# Patient Record
Sex: FEMALE | Race: WHITE
[De-identification: names, ages, dates, MRNs, and addresses within clinical notes are randomized per-mention and may not be internally consistent; named-entity substitution may affect disease eponyms.]

---

## 2018-07-19 ENCOUNTER — HOSPITAL ENCOUNTER (INPATIENT)
Dept: HOSPITAL 25 - ED | Age: 73
LOS: 6 days | Discharge: NURSING, REHAB OR LONG-TERM CARE | DRG: 470 | End: 2018-07-25
Attending: HOSPITALIST | Admitting: INTERNAL MEDICINE
Payer: MEDICARE

## 2018-07-19 DIAGNOSIS — Z79.1: ICD-10-CM

## 2018-07-19 DIAGNOSIS — M16.12: ICD-10-CM

## 2018-07-19 DIAGNOSIS — M85.822: ICD-10-CM

## 2018-07-19 DIAGNOSIS — S72.012A: Primary | ICD-10-CM

## 2018-07-19 DIAGNOSIS — M81.0: ICD-10-CM

## 2018-07-19 DIAGNOSIS — K21.9: ICD-10-CM

## 2018-07-19 DIAGNOSIS — Z79.899: ICD-10-CM

## 2018-07-19 DIAGNOSIS — Z82.49: ICD-10-CM

## 2018-07-19 DIAGNOSIS — Z83.3: ICD-10-CM
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
## 2018-07-19 DIAGNOSIS — I49.3: ICD-10-CM

## 2018-07-19 DIAGNOSIS — E78.5: ICD-10-CM

## 2018-07-19 DIAGNOSIS — J45.909: ICD-10-CM

## 2018-07-19 DIAGNOSIS — Z88.2: ICD-10-CM

## 2018-07-19 DIAGNOSIS — I48.0: ICD-10-CM

## 2018-07-19 DIAGNOSIS — Z88.0: ICD-10-CM
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
## 2018-07-19 DIAGNOSIS — S42.035A: ICD-10-CM

## 2018-07-19 DIAGNOSIS — M85.862: ICD-10-CM

## 2018-07-19 DIAGNOSIS — Z80.9: ICD-10-CM

## 2018-07-19 LAB
BASOPHILS # BLD AUTO: 0 10^3/UL (ref 0–0.2)
EOSINOPHIL # BLD AUTO: 0.1 10^3/UL (ref 0–0.6)
HCT VFR BLD AUTO: 42 % (ref 35–47)
HGB BLD-MCNC: 14.1 G/DL (ref 12–16)
INR PPP/BLD: 1.02 (ref 0.77–1.02)
LYMPHOCYTES # BLD AUTO: 0.9 10^3/UL (ref 1–4.8)
MCH RBC QN AUTO: 29 PG (ref 27–31)
MCHC RBC AUTO-ENTMCNC: 34 G/DL (ref 31–36)
MCV RBC AUTO: 85 FL (ref 80–97)
MONOCYTES # BLD AUTO: 0.9 10^3/UL (ref 0–0.8)
NEUTROPHILS # BLD AUTO: 4.5 10^3/UL (ref 1.5–7.7)
NRBC # BLD AUTO: 0 10^3/UL
NRBC BLD QL AUTO: 0.3
PLATELET # BLD AUTO: 193 10^3/UL (ref 150–450)
RBC # BLD AUTO: 4.95 10^6/UL (ref 4–5.4)
WBC # BLD AUTO: 6.5 10^3/UL (ref 3.5–10.8)

## 2018-07-19 PROCEDURE — 70450 CT HEAD/BRAIN W/O DYE: CPT

## 2018-07-19 PROCEDURE — 84484 ASSAY OF TROPONIN QUANT: CPT

## 2018-07-19 PROCEDURE — 88311 DECALCIFY TISSUE: CPT

## 2018-07-19 PROCEDURE — 82565 ASSAY OF CREATININE: CPT

## 2018-07-19 PROCEDURE — 72125 CT NECK SPINE W/O DYE: CPT

## 2018-07-19 PROCEDURE — 88305 TISSUE EXAM BY PATHOLOGIST: CPT

## 2018-07-19 PROCEDURE — C1776 JOINT DEVICE (IMPLANTABLE): HCPCS

## 2018-07-19 PROCEDURE — 71045 X-RAY EXAM CHEST 1 VIEW: CPT

## 2018-07-19 PROCEDURE — 85014 HEMATOCRIT: CPT

## 2018-07-19 PROCEDURE — 99284 EMERGENCY DEPT VISIT MOD MDM: CPT

## 2018-07-19 PROCEDURE — 84520 ASSAY OF UREA NITROGEN: CPT

## 2018-07-19 PROCEDURE — 36415 COLL VENOUS BLD VENIPUNCTURE: CPT

## 2018-07-19 PROCEDURE — 72192 CT PELVIS W/O DYE: CPT

## 2018-07-19 PROCEDURE — 93306 TTE W/DOPPLER COMPLETE: CPT

## 2018-07-19 PROCEDURE — 93005 ELECTROCARDIOGRAM TRACING: CPT

## 2018-07-19 PROCEDURE — 82607 VITAMIN B-12: CPT

## 2018-07-19 PROCEDURE — 84443 ASSAY THYROID STIM HORMONE: CPT

## 2018-07-19 PROCEDURE — 80053 COMPREHEN METABOLIC PANEL: CPT

## 2018-07-19 PROCEDURE — 87086 URINE CULTURE/COLONY COUNT: CPT

## 2018-07-19 PROCEDURE — 85048 AUTOMATED LEUKOCYTE COUNT: CPT

## 2018-07-19 PROCEDURE — 83605 ASSAY OF LACTIC ACID: CPT

## 2018-07-19 PROCEDURE — 85025 COMPLETE CBC W/AUTO DIFF WBC: CPT

## 2018-07-19 PROCEDURE — 83735 ASSAY OF MAGNESIUM: CPT

## 2018-07-19 PROCEDURE — 85730 THROMBOPLASTIN TIME PARTIAL: CPT

## 2018-07-19 PROCEDURE — 87641 MR-STAPH DNA AMP PROBE: CPT

## 2018-07-19 PROCEDURE — 85018 HEMOGLOBIN: CPT

## 2018-07-19 PROCEDURE — 81003 URINALYSIS AUTO W/O SCOPE: CPT

## 2018-07-19 PROCEDURE — 85610 PROTHROMBIN TIME: CPT

## 2018-07-19 PROCEDURE — 80048 BASIC METABOLIC PNL TOTAL CA: CPT

## 2018-07-19 RX ADMIN — OXYCODONE HYDROCHLORIDE AND ACETAMINOPHEN PRN TAB: 5; 325 TABLET ORAL at 20:27

## 2018-07-19 RX ADMIN — SODIUM CHLORIDE SCH MLS/HR: 900 IRRIGANT IRRIGATION at 23:44

## 2018-07-19 RX ADMIN — TRAMADOL HYDROCHLORIDE PRN MG: 50 TABLET, FILM COATED ORAL at 15:28

## 2018-07-19 RX ADMIN — MORPHINE SULFATE PRN MG: 4 INJECTION INTRAVENOUS at 22:12

## 2018-07-19 RX ADMIN — MAGNESIUM HYDROXIDE SCH: 400 SUSPENSION ORAL at 20:28

## 2018-07-19 RX ADMIN — HEPARIN SODIUM SCH UNITS: 5000 INJECTION INTRAVENOUS; SUBCUTANEOUS at 14:13

## 2018-07-19 RX ADMIN — HEPARIN SODIUM SCH UNITS: 5000 INJECTION INTRAVENOUS; SUBCUTANEOUS at 22:12

## 2018-07-19 RX ADMIN — DOCUSATE SODIUM SCH: 100 CAPSULE, LIQUID FILLED ORAL at 20:28

## 2018-07-19 RX ADMIN — ATORVASTATIN CALCIUM SCH: 20 TABLET, FILM COATED ORAL at 20:28

## 2018-07-19 NOTE — RAD
INDICATION: Fall. Intracranial injury.



COMPARISON: None



TECHNIQUE: Noncontrast axial source images were acquired from the skull base to the

vertex.



FINDINGS:



Ventricles/sulci: The ventricles and cisterns are normal in size and configuration for

age.



Brain parenchyma: There is no acute appearing focal parenchymal finding, evidence of

intracranial mass,  or intracranial mass effect. There are patchy areas of decreased

attenuation in the periventricular and subcortical white matter consistent with chronic

microvascular ischemia



Intracranial hemorrhage:None.



Extra-axial spaces: There are no abnormal extra axial fluid collections or evidence of

extra-axial mass.



Calvarium: There is no calvarial fracture or other calvarial abnormality.



Scalp: There is no evidence of scalp or extracalvarial soft tissue abnormality.



Paranasal sinuses/mastoid: The paranasal sinuses and mastoid air cells are clear.



Other: None.



IMPRESSION: Mild chronic microvascular ischemic change. No acute findings.

## 2018-07-19 NOTE — RAD
HISTORY: fall, left-sided pain



COMPARISONS: None



VIEWS: 4, Frontal, lateral, axial, and oblique views of the left knee



FINDINGS:



BONE DENSITY: There is diffuse osteopenia.

BONES: There is no displaced fracture.

JOINTS: There is mild tricompartmental osteoarthritis. There is no suprapatellar joint

effusion or lipohemarthrosis.

ALIGNMENT: There is no dislocation. 

SOFT TISSUES: Unremarkable.



OTHER FINDINGS: None.



IMPRESSION: 

OSTEOPENIA. NO ACUTE OSSEOUS INJURY. IF SYMPTOMS PERSIST, RECOMMEND REPEAT IMAGING.

## 2018-07-19 NOTE — RAD
INDICATION: Fall. Tachycardia.



COMPARISON: February 22, 2010

 

TECHNIQUE: An AP portable view obtained at 1345 hours is submitted.



FINDINGS: 



Bones/Soft Tissues: There are no acute bony findings.



Cardiomediastinal: The cardiomediastinal silhouette is normal. 



Lungs: There are no infiltrates.



Pleura: There are no pleural effusions. 



Other: None



IMPRESSION:  NO ACTIVE DISEASE.

## 2018-07-19 NOTE — RAD
INDICATION: Fall last Saturday. LEFT hip fracture. LEFT shoulder pain. Denies hitting head



COMPARISON: No relevant prior exams available [place] [facility] [place].



TECHNIQUE: Multidetector CT images foramen magnum to lung apices without contrast.

Multiplanar reformation.



REPORT:  Normal vertebral alignment accounting for exam positioning without

spondylolisthesis or subluxation at any level. Negative for cervical vertebral body or

posterior element fracture. Negative for paravertebral hematoma.



Multilevel degenerative spondylosis and facet joint osteoarthritis. More significant facet

joint osteoarthritis is noted at C2-C3 on the LEFT with complete joint space narrowing and

associated subchondral sclerosis and cystic change. At C4-C5 there is mild to moderate

disc space narrowing and at C5-C6 there is moderate disc space narrowing. Disc osteophyte

complex at C5-C6 results in mild acquired central canal stenosis. 



IMPRESSION: 

#. No CT evidence for traumatic cervical spine injury.

## 2018-07-19 NOTE — ED
Upper Extremity Pain





- HPI Summary


HPI Summary: 





This is liliam Israel documenting for attending Dr. Phillip M.D.


Pt is a 72 y/o F w/ c/o left shoulder pain onsetting five days ago. Pt states 

five days ago she was doing yardwork. While squatting, she reports getting up 

too quickly, states she felt light-headed, and lost her balance which resulted 

in the shoulder injury. She believes she may have hit the leftside of her jaw 

during the fall as well but did not experience LOC. Pt notes she had a similar 

fall a few months ago as well. She also reports a wound present on her left leg 

which required stitches and lower leg pain which prevents her from getting up 

and walking on her own. Hx of osteoporosis, HLD, and GERD is reported. Allergy 

to sulfur medications, PCTV, tree fruits and nuts is noted. Pt is on 

omerprazole caps, 20 mg, rosevastatin tabs, 10 mg, and vitamin tablets. She 

denies taking blood thinners. 








- History of Current Complaint


Chief Complaint: EDGeneral


Stated Complaint: PAIN


Time Seen by Provider: 07/19/18 09:04





- Allergies/Home Medications


Allergies/Adverse Reactions: 


 Allergies











Allergy/AdvReac Type Severity Reaction Status Date / Time


 


Sulfa (Sulfonamide Allergy Intermediate Rash Verified 07/19/18 09:14





Antibiotics)     


 


Penicillins Allergy  Unknown Verified 07/19/18 09:14





   Reaction  





   Details  











Home Medications: 


 Home Medications





Acetaminophen [Tylenol Extra Strength] 500 mg PO Q8H PRN 07/19/18 [History 

Confirmed 07/19/18]











PMH/Surg Hx/FS Hx/Imm Hx


Endocrine/Hematology History: 


   Denies: Hx Sickle Cell Disease


Cardiovascular History: 


   Denies: Other Cardiovascular Problems/Disorders


Respiratory History: 


   Denies: Other Respiratory Problems/Disorders


GI History: Reports: Hx Gastroesophageal Reflux Disease - WELL CONTROLLED WITH 

MEDICATION


   Denies: Other GI Disorders


 History: 


   Denies: Other  Problems/Disorders


Musculoskeletal History: Reports: Hx Arthritis - THUMBS


   Denies: Other Musculoskeletal History


Sensory History: Reports: Hx Contacts or Glasses - GLASSES


   Denies: Hx Hearing Aid


Opthamlomology History: Reports: Hx Contacts or Glasses - GLASSES


Neurological History: 


   Denies: Other Neuro Impairments/Disorders





- Cancer History


Hx Chemotherapy: No


Hx Radiation Therapy: No





- Surgical History


Surgery Procedure, Year, and Place: HYSTERECTOMY - 1990 [facility].  GALLBLADDER - 

1995 [facility]


Hx Anesthesia Reactions: No


Infectious Disease History: No


Infectious Disease History: 


   Denies: Traveled Outside the [place] in Last 30 Days





- Social History


Alcohol Use: None


Substance Use Type: Reports: None


Smoking Status (MU): Never Smoked Tobacco





Physical Exam





- Summary


Physical Exam Summary: 





GENERAL: Patient is a well developed and nourished __(M/F)__ who is lying 

comfortable in the stretcher. Patient is not in any acute respiratory distress.


HEAD AND FACE: Normocephalic


EYES: PERRLA, EOMI x 2.


EARS: Hearing grossly intact.


MOUTH: Oropharynx within normal limits.


NECK: Supple, trachea is midline, no adenopathy, no JVD, no carotid bruit.


CHEST: Symmetric, no tenderness at palpation


LUNGS: Clear to auscultation bilaterally. No wheezing or crackles.


CVS: Regular rate and rhythm, S1 and S2 present, no murmurs or gallops 

appreciated.


ABDOMEN: Soft, non-tender. Bowel sounds are normal. No abdominal abnormal 

pulsations.


EXTREMITIES: No cyanosis or clubbing. Ecchymosis on left shoulder w/ tenderness 

to palpation, FROM; nothing in the left humerus. Left leg wound is noted. Pain 

from flexion in left knee and limited ROM secondary to pain. 


NEURO: Alert and oriented x 3. No acute neurological deficits. Speech is normal 

and follows commands.


SKIN: Dry and warm





Triage Information Reviewed: Yes


Vital Signs On Initial Exam: 


 Initial Vitals











Temp Pulse Resp BP Pulse Ox


 


 97.9 F   110   18   146/95   99 


 


 07/19/18 09:10  07/19/18 09:10  07/19/18 09:10  07/19/18 09:10  07/19/18 09:10











Vital Signs Reviewed: Yes





Diagnostics





- Vital Signs


 Vital Signs











  Temp Pulse Resp BP Pulse Ox


 


 07/19/18 09:11   110    98


 


 07/19/18 09:10  97.9 F  110  18  146/95  99














- Laboratory


Result Diagrams: 


 07/19/18 09:54





 07/19/18 09:54


Lab Statement: Any lab studies that have been ordered have been reviewed, and 

results considered in the medical decision making process.





- Radiology


  ** Left hip X-ray


Xray Interpretation: Positive (See Comments)


Radiology Interpretation Completed By: Radiologist - Nondisplaced fracture of 

the subcapital femoral neck on the left; osteopenia; osteoarthritis. This 

report was reviewed by ED physician.





  ** Left knee X-ray


Xray Interpretation: No Acute Changes


Radiology Interpretation Completed By: Radiologist - Osteopenia. No acture 

osseous injury. If symptoms persist, recommend repeat imaging. This report was 

reviewed by ED physician.





  ** Left shoulder X-ray


Xray Interpretation: Positive (See Comments)


Radiology Interpretation Completed By: Radiologist - Comminuted nondisplaced 

fracture of the distal left clavicle. Osteopenia. This report was reviewed by 

ED physician.





- EKG


  ** 0948


Cardiac Rate: Tachycardia - Rate of 104 BPM


EKG Rhythm: Sinus Tachycardia


EKG Interpretation: PACs are noted as present 





Re-Evaluation





- Re-Evaluation


  ** First Eval


Re-Evaluation Time: 11:20


Change: Unchanged


Comment: Discussed X-ray results with patient.





Discharge





- Discharge Plan


Referrals: 


Nick Charlton MD [Primary Care Provider] -

## 2018-07-19 NOTE — RAD
HISTORY: fall, left-sided pain



COMPARISONS: None



VIEWS: 6, Frontal internal rotation, external rotation, outlet, and axillary views of the

left shoulder



FINDINGS:



BONE DENSITY: There is diffuse osteopenia.

BONES: There is a comminuted nondisplaced fracture of the distal (lateral) left clavicle.

JOINTS: There is mild osteoarthritis of the a.c. and glenohumeral joints.

ALIGNMENT: There is no dislocation. 

SOFT TISSUES: Unremarkable.



OTHER FINDINGS: None.



IMPRESSION: 

1.  COMMINUTED NONDISPLACED FRACTURE OF THE DISTAL LEFT CLAVICLE.

2.  OSTEOPENIA.

## 2018-07-19 NOTE — ED
Complex/Multi-Sys Presentation





- HPI Summary


HPI Summary: 





This is liliam Israel documenting for attending Dr. Phillip M.D.


Pt is a 72 y/o F w/ c/o left shoulder pain onsetting five days ago. Pt states 

five days ago she was doing yardwork. While squatting, she reports getting up 

too quickly, states she felt light-headed, and lost her balance which resulted 

in the shoulder injury. She believes she may have hit the leftside of her jaw 

during the fall as well but did not experience LOC. Pt notes she had a similar 

fall a few months ago as well. She also reports left sided hip pain, a wound 

from a separate incident present on her left leg which required stitches, and 

lower leg pain which prevents her from getting up and walking on her own. Pain 

is rated 8/10 and nothing is noted to aggravate/alleviate pain. Hx of 

osteoporosis, HLD, and GERD is reported. Allergy to sulfur medications, PCTV, 

tree fruits and nuts is noted. Pt is on omerprazole caps, 20 mg, rosevastatin 

tabs, 10 mg, and vitamin tablets. She denies taking blood thinners.








- History Of Current Complaint


Chief Complaint: EDGeneral


Time Seen by Provider: 07/19/18 09:04


Hx Obtained From: Patient


Onset/Duration: Lasting Days - 5 days ago


Timing: Constant, Days - 5 days


Severity Initially: Severe - 8/10


Location: Pain At: - left shoulder, left hip, lower left leg pain


Aggravating Factor(s): nothing


Alleviating Factor(s): nothing


Associated Signs And Symptoms: Positive: Dizziness - light-headedness at time 

of fall, Recent Trauma - fall from a standing height, Other - NEGATIVE: LOC 

from falling injury





- Allergies/Home Medications


Allergies/Adverse Reactions: 


 Allergies











Allergy/AdvReac Type Severity Reaction Status Date / Time


 


Sulfa (Sulfonamide Allergy Intermediate Rash Verified 07/19/18 09:14





Antibiotics)     


 


Penicillins Allergy  Unknown Verified 07/19/18 09:14





   Reaction  





   Details  











Home Medications: 


 Home Medications





Acetaminophen [Tylenol Extra Strength] 500 mg PO Q8H PRN 07/19/18 [History 

Confirmed 07/19/18]











PMH/Surg Hx/FS Hx/Imm Hx


Endocrine/Hematology History: 


   Denies: Hx Sickle Cell Disease


Cardiovascular History: 


   Denies: Other Cardiovascular Problems/Disorders


Respiratory History: 


   Denies: Other Respiratory Problems/Disorders


GI History: Reports: Hx Gastroesophageal Reflux Disease - WELL CONTROLLED WITH 

MEDICATION


   Denies: Other GI Disorders


 History: 


   Denies: Other  Problems/Disorders


Musculoskeletal History: Reports: Hx Arthritis - THUMBS


   Denies: Other Musculoskeletal History


Sensory History: Reports: Hx Contacts or Glasses - GLASSES


   Denies: Hx Hearing Aid


Opthamlomology History: Reports: Hx Contacts or Glasses - GLASSES


Neurological History: 


   Denies: Other Neuro Impairments/Disorders





- Cancer History


Hx Chemotherapy: No


Hx Radiation Therapy: No





- Surgical History


Surgery Procedure, Year, and Place: HYSTERECTOMY - 1990 [facility].  GALLBLADDER - 

1995 [facility]


Hx Anesthesia Reactions: No


Infectious Disease History: No


Infectious Disease History: 


   Denies: Traveled Outside the [place] in Last 30 Days





- Social History


Alcohol Use: None


Substance Use Type: Reports: None


Smoking Status (MU): Never Smoked Tobacco





Review of Systems


Positive: Other - POSITIVE: left hip pain, left shoulder pain, left lower knee 

pain, inability to walk


Neurological: Other - POSITIVE: Light-headedness during time of injury 


Negative: Syncope - no LOC during time of injury


All Other Systems Reviewed And Are Negative: Yes





Physical Exam





- Summary


Physical Exam Summary: 





GENERAL: Patient is a well developed and nourished female who is lying 

comfortable in the stretcher. Patient is not in any acute respiratory distress.


HEAD AND FACE: Normocephalic


EYES: PERRLA, EOMI x 2.


EARS: Hearing grossly intact.


MOUTH: Oropharynx within normal limits.


NECK: Supple, trachea is midline, no adenopathy, no JVD, no carotid bruit.


CHEST: Symmetric, no tenderness at palpation


LUNGS: Clear to auscultation bilaterally. No wheezing or crackles.


CVS: Regular rate and rhythm, S1 and S2 present, no murmurs or gallops 

appreciated.


ABDOMEN: Soft, non-tender. Bowel sounds are normal. No abdominal abnormal 

pulsations.


EXTREMITIES: No edema, no cyanosis or clubbing. Ecchymosis on left shoulder, 

tenderness to palpation, nothing in the humerus. Left leg wound is noted. Pain 

from flexion in left knee, limited ROM secondary to pain. 


NEURO: Alert and oriented x 3. No acute neurological deficits. Speech is normal 

and follows commands.


SKIN: Dry and warm





Triage Information Reviewed: Yes


Vital Signs On Initial Exam: 


 Initial Vitals











Temp Pulse Resp BP Pulse Ox


 


 97.9 F   110   18   146/95   99 


 


 07/19/18 09:10  07/19/18 09:10  07/19/18 09:10  07/19/18 09:10  07/19/18 09:10











Vital Signs Reviewed: Yes





Diagnostics





- Vital Signs


 Vital Signs











  Temp Pulse Resp BP Pulse Ox


 


 07/19/18 11:10   102   129/88  96


 


 07/19/18 11:00   102    98


 


 07/19/18 10:58   99   141/92  99


 


 07/19/18 10:57      100


 


 07/19/18 10:47   101    98


 


 07/19/18 09:39     133/86 


 


 07/19/18 09:11   110    98


 


 07/19/18 09:10  97.9 F  110  18  146/95  99














- Laboratory


Lab Results: 


 Lab Results











  07/19/18 07/19/18 07/19/18 Range/Units





  09:48 09:54 09:54 


 


WBC   6.5   (3.5-10.8)  10^3/ul


 


RBC   4.95   (4.00-5.40)  10^6/ul


 


Hgb   14.1   (12.0-16.0)  g/dl


 


Hct   42   (35-47)  %


 


MCV   85   (80-97)  fL


 


MCH   29   (27-31)  pg


 


MCHC   34   (31-36)  g/dl


 


RDW   14   (10.5-15)  %


 


Plt Count   193   (150-450)  10^3/ul


 


MPV   7.8   (7.4-10.4)  um3


 


Neut % (Auto)   69.9   (38-83)  %


 


Lymph % (Auto)   13.8 L   (25-47)  %


 


Mono % (Auto)   14.2 H   (0-7)  %


 


Eos % (Auto)   1.4   (0-6)  %


 


Baso % (Auto)   0.7   (0-2)  %


 


Absolute Neuts (auto)   4.5   (1.5-7.7)  10^3/ul


 


Absolute Lymphs (auto)   0.9 L   (1.0-4.8)  10^3/ul


 


Absolute Monos (auto)   0.9 H   (0-0.8)  10^3/ul


 


Absolute Eos (auto)   0.1   (0-0.6)  10^3/ul


 


Absolute Basos (auto)   0   (0-0.2)  10^3/ul


 


Absolute Nucleated RBC   0   10^3/ul


 


Nucleated RBC %   0.3   


 


INR (Anticoag Therapy)    1.02  (0.77-1.02)  


 


APTT    45.1 H  (26.0-36.3)  seconds


 


Sodium     (135-145)  mmol/L


 


Potassium     (3.5-5.0)  mmol/L


 


Chloride     (101-111)  mmol/L


 


Carbon Dioxide     (22-32)  mmol/L


 


Anion Gap     (2-11)  mmol/L


 


BUN     (6-24)  mg/dL


 


Creatinine     (0.51-0.95)  mg/dL


 


Est GFR ( Amer)     (>60)  


 


Est GFR (Non-Af Amer)     (>60)  


 


BUN/Creatinine Ratio     (8-20)  


 


Glucose     ()  mg/dL


 


Lactic Acid     (0.5-2.0)  mmol/L


 


Calcium     (8.6-10.3)  mg/dL


 


Total Bilirubin     (0.2-1.0)  mg/dL


 


AST     (13-39)  U/L


 


ALT     (7-52)  U/L


 


Alkaline Phosphatase     ()  U/L


 


Troponin I     (<0.04)  ng/mL


 


Total Protein     (6.4-8.9)  g/dL


 


Albumin     (3.2-5.2)  g/dL


 


Globulin     (2-4)  g/dL


 


Albumin/Globulin Ratio     (1-3)  


 


Urine Color  Straw    


 


Urine Appearance  Clear    


 


Urine pH  7.0    (5-9)  


 


Ur Specific Gravity  1.003 L    (1.010-1.030)  


 


Urine Protein  Negative    (Negative)  


 


Urine Ketones  1+ A    (Negative)  


 


Urine Blood  Negative    (Negative)  


 


Urine Nitrate  Negative    (Negative)  


 


Urine Bilirubin  Negative    (Negative)  


 


Urine Urobilinogen  Negative    (Negative)  


 


Ur Leukocyte Esterase  Negative    (Negative)  


 


Urine Glucose  Negative    (Negative)  














  07/19/18 07/19/18 Range/Units





  09:54 09:54 


 


WBC    (3.5-10.8)  10^3/ul


 


RBC    (4.00-5.40)  10^6/ul


 


Hgb    (12.0-16.0)  g/dl


 


Hct    (35-47)  %


 


MCV    (80-97)  fL


 


MCH    (27-31)  pg


 


MCHC    (31-36)  g/dl


 


RDW    (10.5-15)  %


 


Plt Count    (150-450)  10^3/ul


 


MPV    (7.4-10.4)  um3


 


Neut % (Auto)    (38-83)  %


 


Lymph % (Auto)    (25-47)  %


 


Mono % (Auto)    (0-7)  %


 


Eos % (Auto)    (0-6)  %


 


Baso % (Auto)    (0-2)  %


 


Absolute Neuts (auto)    (1.5-7.7)  10^3/ul


 


Absolute Lymphs (auto)    (1.0-4.8)  10^3/ul


 


Absolute Monos (auto)    (0-0.8)  10^3/ul


 


Absolute Eos (auto)    (0-0.6)  10^3/ul


 


Absolute Basos (auto)    (0-0.2)  10^3/ul


 


Absolute Nucleated RBC    10^3/ul


 


Nucleated RBC %    


 


INR (Anticoag Therapy)    (0.77-1.02)  


 


APTT    (26.0-36.3)  seconds


 


Sodium  141   (135-145)  mmol/L


 


Potassium  3.6   (3.5-5.0)  mmol/L


 


Chloride  102   (101-111)  mmol/L


 


Carbon Dioxide  29   (22-32)  mmol/L


 


Anion Gap  10   (2-11)  mmol/L


 


BUN  6   (6-24)  mg/dL


 


Creatinine  0.63   (0.51-0.95)  mg/dL


 


Est GFR ( Amer)  111.8   (>60)  


 


Est GFR (Non-Af Amer)  92.4   (>60)  


 


BUN/Creatinine Ratio  9.5   (8-20)  


 


Glucose  107 H   ()  mg/dL


 


Lactic Acid   1.0  (0.5-2.0)  mmol/L


 


Calcium  9.2   (8.6-10.3)  mg/dL


 


Total Bilirubin  1.10 H   (0.2-1.0)  mg/dL


 


AST  15   (13-39)  U/L


 


ALT  11   (7-52)  U/L


 


Alkaline Phosphatase  49   ()  U/L


 


Troponin I  0.00   (<0.04)  ng/mL


 


Total Protein  6.5   (6.4-8.9)  g/dL


 


Albumin  3.9   (3.2-5.2)  g/dL


 


Globulin  2.6   (2-4)  g/dL


 


Albumin/Globulin Ratio  1.5   (1-3)  


 


Urine Color    


 


Urine Appearance    


 


Urine pH    (5-9)  


 


Ur Specific Gravity    (1.010-1.030)  


 


Urine Protein    (Negative)  


 


Urine Ketones    (Negative)  


 


Urine Blood    (Negative)  


 


Urine Nitrate    (Negative)  


 


Urine Bilirubin    (Negative)  


 


Urine Urobilinogen    (Negative)  


 


Ur Leukocyte Esterase    (Negative)  


 


Urine Glucose    (Negative)  











Result Diagrams: 


 07/20/18 05:11





 07/20/18 05:11


Lab Statement: Any lab studies that have been ordered have been reviewed, and 

results considered in the medical decision making process.





- Radiology


  ** Left hip X-ray


Xray Interpretation: Positive (See Comments)


Radiology Interpretation Completed By: Radiologist - Nondisplaced fracture of 

the subcapital femoral neck on the left. Osteopenia. Osteoarthiritis. This 

report was reviewed by ED physician.





  ** Left knee X-ray


Xray Interpretation: No Acute Changes


Radiology Interpretation Completed By: Radiologist - Osteopenia, no acute 

osseous injury. This report was reviewed by ED physician.





  ** Left shoulder X-ray


Xray Interpretation: Positive (See Comments)


Radiology Interpretation Completed By: Radiologist - Comminuted nondisplaced 

fracture of the distal left clavicle, osteopenia. This report was reviewed by 

ED physician.





- EKG


  ** 0948


Cardiac Rate: Tachycardia - Rate of 104 BPM


EKG Rhythm: Sinus Tachycardia


EKG Interpretation: PACs are present. 





Complex Multi-Symp Course/Dx


Assessment/Plan: Pt is a 72 y/o F w/ c/o left shoulder pain onsetting five days 

ago. While doing yardwork and squatting, she reports getting up too quickly, 

states she felt light-headed, and lost her balance which resulted in the 

shoulder injury. She believes she may have hit the leftside of her jaw during 

the fall as well but did not experience LOC. Pt notes she had a similar fall a 

few months ago as well. She also reports left sided hip pain, a wound from a 

separate incident present on her left leg which required stitches, and lower 

leg pain which prevents her from getting up and walking on her own. Pain is 

rated 8/10 and nothing is noted to aggravate/alleviate pain. Hx of osteoporosis

, HLD, and GERD is reported. Allergy to sulfur medications, PCTV, tree fruits 

and nuts is noted. Pt is on omerprazole caps, 20 mg, rosevastatin tabs, 10 mg, 

and vitamin tablets. She denies taking blood thinners. Physical exam showed 

ecchymosis on left shoulder, tenderness to palpation, nothing in the humerus. 

Left leg wound is noted. Pain from flexion in left knee, limited ROM secondary 

to pain. X-rays were taken of left hip/pelvis, left knee, and left shoulder. 

Comminuted nondisplaced fracture of the distal left clavicle and osteopenia was 

noted in left shoulder. Nondisplaced fracture of the subcapital femoral neck on 

the left, osteopenia and osteoarthiritis were noted from left hip/pelvis X-ray. 

Dr. Thapa was consulted who recommended CAT of pelvis and admission to 

hospital Pt was later discussed with Dr. Cowart who accepts Pt for admission to 

[facility] with a diagnosis of left hip fracture.





- Diagnoses


Provider Diagnoses: 


 Hip fracture, left








- Physician Notifications


Discussed Care Of Patient With: Fahad Thapa


Time Discussed With Above Provider: 11:42


Instructed by Provider To: Other - Dr. Thapa was consulted about Pt's 

fractures. Dr. Thapa recommends CAT scan of pelvis as well as admission to 

hospital. 1156: Consult with hospitalist Dr. Cowart. Discussed Pt's Sx, test 

results, and Dr. Thapa's recommendation. Dr. Cowart agrees to accept Pt to [facility].





Discharge





- Sign-Out/Discharge


Documenting (check all that apply): Patient Departure - admit 





- Discharge Plan


Condition: Fair


Disposition: ADMITTED TO [facility]





- Billing Disposition and Condition


Condition: FAIR


Disposition: Admitted to [facility]

## 2018-07-19 NOTE — RAD
Indication: LEFT hip pain since fall last Saturday. Osteoporosis based on May 12, 2011

DEXA scan.



Comparison: No relevant prior exams available on the [facility] PACS for comparison.



Technique: Multidetector CT pelvis without contrast. Multiplanar reformation with bone

algorithm.



Report: Mildly impacted subcapital fracture of the LEFT femoral neck with mild apex

anterior angulation and small hip joint effusion and surrounding soft tissue edema. The

LEFT femoral head remains normally located at the acetabulum.



Negative for pelvic fracture or joint diastases.



Images through the visceral pelvis are remarkable for prior appendectomy and severe

diverticulosis of the visualized colon without findings of acute diverticulitis. Negative

for ascites. Post hysterectomy. Unremarkable adnexal regions.



Negative for superficial soft tissue plane hematoma.



IMPRESSION: 

#. Acute/subacute mildly impacted subcapital fracture of the LEFT femoral neck with mild

apex anterior angulation and small associated joint effusion and surrounding soft tissue

edema. 

#. Results discussed with nurse Woodson at 7/19/2018 1:43 PM EDT

## 2018-07-19 NOTE — CONS
CONSULTATION REPORT:

 

DATE OF CONSULT:  07/19/18

 

ATTENDING PROVIDER:  Dr. Fahad Thapa.

 

CHIEF COMPLAINT:  Left hip fracture.

 

HISTORY OF PRESENT ILLNESS:  Ms. Luis is a 73-year-old female who presented to 
[facility] [place] [facility] on 07/19/18 with left hip and shoulder pain, onset 5 days 
ago.  She states that 5 days ago, she was doing yard work and was squatting 
doing yard work. She was squatting and when she went to stand up, she became 
lightheaded and fell down on to her left side.  She did have immediate pain of 
her left shoulder and her left hip, but as she has fallen before without 
significant injury she did not feel that she needed to come right to the 
emergency room.  She states that she did hit her jaw on the left side, but had 
no loss of consciousness.  Since then, she has continued to get around on her 
own, though she has been rolling around her home in an office chair.  She is 
unable to walk on the left lower extremity.  She has been taking Tylenol only 
for pain, which has offered adequate pain reduction.  Her past medical history 
includes osteoporosis, hyperlipidemia, GERD.

 

Roughly 3 months ago, she had a similar fall at home in which she hit her left 
lower leg on a step and had a large wound that has been treated first by Dr. Rae and then [facility].  This area has almost 
healed, though she is applying new bandage to it still once per week.  She has 
not had infection of this area in several months.  The patient has had surgery 
in the past without any negative side effects from anesthesia.  She has no 
history of heart attacks, stroke, or blood clot.

 

PAST MEDICAL HISTORY:  Includes wound to the left lower extremity, traumatic in 
nature, well-healing; hyperlipidemia; GERD.

 

PAST SURGICAL HISTORY:  Includes hysterectomy, cholecystectomy, possibly a 
partial colectomy, the patient was unsure.

 

MEDICATIONS:  Home medications:

1.  Rosuvastatin 10 mg daily.

2.  Omeprazole 20 mg daily.

3.  Acetaminophen 500 mg p.o. q.8 hours p.r.n. pain.

 

ALLERGIES:  SULFA drugs causing a rash, PENICILLINS with unknown reaction.

 

SOCIAL HISTORY:  The patient lives at home alone.  She takes care of herself.  
She continues to participate in yard work and activities of daily living 
without any assistive devices to walk.  Her daughter does live nearby.  She is 
a nonsmoker. She does not drink alcohol or use drugs.

 

REVIEW OF SYSTEMS:  General:  Denies fever or chills.  Head:  No headache.  No 
change in vision.  Heart:  No irregular heart beat.  No chest pain.  Respiratory
: No difficulty breathing.  GI:  No nausea, vomiting, diarrhea.  :  No 
burning with urination.  Musculoskeletal:  Left upper extremity and left lower 
extremity pain. Neuro:  No decreased sensation of extremities.  Hematology:  No 
history of blood clot.

 

PHYSICAL EXAM:  Vital Signs:  Temperature 97.9, pulse rate currently 87, but 
she has had some tachycardia up to 110, respiratory rate 17, oxygen saturation 
98, blood pressure 125/89.  General:  The patient is well appearing, in no 
acute distress.  She is lying comfortably in her hospital bed.  Head:  
Normocephalic, atraumatic.  Eyes:  Extraocular movements intact.  Neck:  No 
cervical midline tenderness.  Cardiac:  S1, S2.  Regular rhythm.  Lungs:  Clear 
to auscultation bilaterally.  Abdomen:  Bowel sounds normoactive.  Soft, 
nontender.  No masses palpated.  Extremities:  Moves right upper extremity and 
right lower extremity well.  No tenderness to palpation.  Left upper extremity:
  The patient has bruising over her clavicle, which is now yellow and purple in 
nature.  She is tender over the clavicle.  She is nontender over the shoulder, 
elbow, or wrist:  She is able to move the wrist and elbow well.  She has 
limited range of motion of the shoulder. Sensation is intact to light touch 
throughout all digits and capillary refill is brisk distally.  Radial pulse is 2
+.  Left lower extremity:  There is no rotation and no shortening of the left 
lower extremity.  Skin envelope is intact aside from a well-healing leg wound 
on the anterior shin measuring less than 1 cm. Dorsiflexion, plantarflexion 
intact.  Flexion at knee elicits pain at the hip.  No obvious edison deformity 
noted.  Thigh is soft.  Vascular:  Radial pulse 2+, bilateral DP, PT 2+.

 

DIAGNOSTIC STUDIES/LAB DATA:  Hip and pelvis x-ray demonstrates a nondisplaced 
fracture of the subcapital femoral neck on the left.  Shoulder x-ray:  
Comminuted nondisplaced fracture of the distal left clavicle.

 

ASSESSMENT:

1.  Left clavicle fracture.

2.  Left femoral neck fracture.

 

PLAN:  The patient will be n.p.o. for the operating room after midnight, until 
then she may eat a normal diet.  The patient is nonweightbearing, though she 
may stand on her right foot to pivot to use the commode. She will be taken to 
the operating room tomorrow, or when she is medically optimized.  The patient 
and her daughter are agreeable to this.

 

____________________________________ NATHANAEL MONTES DE OCA

 

[id number] #: [id number]

CARLOS

## 2018-07-19 NOTE — HP
CC:  Dr. Charlton; Dr. Thapa *

 

HISTORY AND PHYSICAL:

 

DATE OF ADMISSION:  07/19/18

 

PRIMARY CARE PROVIDER:  Dr. Charlton.

 

MY ATTENDING PHYSICIAN WHILE IN THE HOSPITAL:  Dr. Luciana Cowart * (report 
dictated by Coleman Wells NP).

 

CONSULTING ORTHOPEDIST:  Dr. Thapa.

 

CHIEF COMPLAINT:  Fall.

 

HISTORY OF PRESENT ILLNESS:  Ms. Luis is a 73-year-old female patient.  She has 
a history of GERD, hyperlipidemia, history of seasonal allergies, history of 
asthma, although she cannot remember the last time she took anything for her 
asthma.  She also carries a history of osteoporosis.  She comes to our ER 
today.  She says Saturday, she was outside for 1 hour plus.  She had gone down 
to get the mail.  She grabbed some weed killer.  She wanted to kill the weed in 
her driveway.  She had been bending over for sometime spraying the weeds, not 
looking up.  She went to go stand up, she had not really been eating or 
drinking and she got dizzy.  She did not pass out.  She felt lightheaded.  She 
fell, landed on her left side.  She was able to crawl to her house and her 
kitchen.  She got into a chair that had wheels on it and she since Saturday has 
been rolling around the house with a chair using her legs to guide her and pull 
her around.  She was hopeful that the pain that she has been having in her left 
hip would go away and that she may have pulled something.  She said that she 
could not stand because it was too painful to move her or to try to stand up.  
She said that today, the pain just was not getting any better.  She was 
concerned, she told her daughter.  She decided to come into the ED today and it 
was ultimately found that she had a left hip fracture.  She denies any recent 
vomiting.  No change in her stool pattern.  She says she always has some 
diarrhea.  She denies any abdominal pain.  No recent fevers or cough.  No chest 
pain.  She says that she can go up a flight of stairs and she does not get any 
chest tightness or heaviness typically, but again, because of the fracture of 
the hip, we were asked to evaluate her for admission.

 

PAST MEDICAL HISTORY:  Significant for:

1.  GERD.

2.  Hyperlipidemia.

3.  Asthma.

4.  Seasonal allergies.

5.  Osteoporosis.

 

PAST SURGICAL HISTORY:  She has had:

1.  Cataract extractions.

2.  Hysterectomy.

3.  Partial bowel resection.

 

MEDICATIONS:  Home meds include:

1.  Tylenol Extra Strength 500 mg every 8 hours as needed.

2.  Crestor 10 mg at bedtime.

3.  Prilosec 20 mg daily.

4.  Vitamin D 4000 units p.o. daily.

 

ALLERGIES TO MEDICATIONS:  Include SULFA and PENICILLIN.

 

FAMILY HISTORY:  Mother had a history of hypertension and diabetes.  Father had 
a history of cancer.

 

SOCIAL HISTORY:  She does not smoke.  She does not drink.  She does live alone. 
Surrogate decision maker is her daughter.

 

REVIEW OF SYSTEMS:  There is no documented fever.  She is denying having any 
significant weight change.  There is no ear discharge.  She is denying having 
any rhinorrhea.  There is no sore throat.  There is no thyroid enlargement.  
Again, denies having any chest pressure or tightness.  No shortness of breath.  
No abdominal pain, no nausea, no vomiting.  No dysuria, no frequency.  There 
was no loss of consciousness.  No pruritus and no skin ulcerations.  Review of 
14 systems was completed, all others negative.

 

                               PHYSICAL EXAMINATION

 

GENERAL:  At this time, Ms. Luis is a 73-year-old female patient.  She is 
sitting in the ED stretcher.  She appears to be well-nourished, well-developed.
  She does not appear to be in any acute distress.

 

VITAL SIGNS:  Blood pressure 138/73, pulse 107, respirations 18, O2 sat 99%, 
temperature 97.9.

 

HEENT:  Head:  Atraumatic, normocephalic.  Eyes:  EOMs intact.  Sclerae 
anicteric and not pale.  Throat:  Oral mucosa appears to be moist.  No 
oropharyngeal erythema.

 

NECK:  Supple.

 

LUNGS:  Clear to auscultation.  No wheezes, rales, or rhonchi.

 

HEART:  Sounds S1, S2.  She is tachycardic.  No murmurs, rubs, or gallops.

 

ABDOMEN:  Soft, flat, nontender.  Bowel sounds are present.

 

EXTREMITIES:  Pulses were 2+ throughout.  No peripheral edema.  Distal CSM 
checks were intact.  She can move the right extremity with 5/5 strength.  She 
has difficulty moving the left extremity because of pain.  She has good dorsi 
and plantarflexion with 5/5 strength.  Upper extremities have 5/5 strength.

 

NEUROLOGICAL:  The patient is awake.  She is alert.  Her speech is clear.  Her 
tongue is midline.  Her  were equal.  There was no facial drooping.  She 
had no gross focal deficits.

 

SKIN:  Intact with exception she does have a laceration from a fall she 
sustained in the spring to the left shin, which is being followed with Dr. Rae in the outpatient setting.  There is no erythema or discharge noted 
here today. Otherwise, skin is intact.

 

 DIAGNOSTIC STUDIES/LAB DATA:  WBC 6.5, RBC 4.95, hemoglobin 14.1, hematocrit 42
, platelet count of 193.  INR 1.02, PTT of 45.1.  Sodium 141, potassium of 3.6, 
chloride of 102, bicarb 29, BUN 6, creatinine of 0.63, glucose 107, lactic 1, 
calcium 9.2, mag pending.  Total bili 1.1, AST 15, ALT 11, alk phos 49.  
Troponin 0, repeat troponin was 0.  Her albumin was 3.9.  B12 and TSH are 
pending.  Urine was obtained and it was negative.

 

She did have again imaging here in the ED.  Hip, pelvis x-ray showed, impression
: Nondisplaced fracture of the subcapital femoral neck on the left, osteopenia, 
osteoarthritis.

 

She also had a shoulder x-ray, which showed comminuted nondisplaced fracture of 
the distal left clavicle, osteopenia.

 

She had a knee x-ray, which showed osteopenia, no acute osseous injury.  If 
symptoms persist, recommend repeat imaging.

 

Chest x-ray is pending.

 

She did have an EKG obtained today, which when I reviewed, it does appear that 
she has PVC.  She has a sinus tachycardia, rate of 104.  No elevations or T-
wave inversions.  When you review it to her previous EKG, it is similar.  Her 
previous EKG from 8 years ago, her heart rate was 101.  Looking back at her 
records here, she is always persistently running in the upper 90s to the low 
100s for her heart rate.

 

Old medical records were reviewed.

 

ASSESSMENT AND PLAN:  Ms. Luis is a 73-year-old female patient coming into the 
ED today with complaints of mechanical fall.  We were asked to evaluate for 
admission. She will be admitted under inpatient status for:

 

1.  Left hip fracture.  At this point, Dr. Thapa has been notified.  The 
patient will undergo CT imaging of the pelvis.  He has also been notified of 
the left clavicle fracture.  At this point, we will place the patient in a 
sling for comfort.  He will be following for this as well, and because of the 
fall, I am also going to get a CT of the head and neck as she said she did hit 
the back of her neck, but there is no pain there.  I do not suspect she has a 
fracture, but I want to make sure there were not any other injuries unbeknownst 
to us.  In terms of her perioperative risk stratification, her RCRI is 0, she 
is low risk at this point, so I believe she is medically optimized for proposed 
procedure.  She is mildly tachycardic here, but I suspect this is from mild 
dehydration.  Her normal baseline heart rate is again in the low 100s, but she 
is running about 110 right now, so I am going to give her fluids and monitor 
this.  I am checking a mag and B12 as well and also TSH.

2.  Tachycardia.  Again, suspect this is probably near her baseline.  I am 
going to give her fluids.  Her baseline heart rate does appear to be around 
100.  EKG looked unchanged.  I believe she is at her  baseline and medically 
optimized.  I will hydrate her and follow this.

3.  Falls.  She does say that she fell once again this past spring and so again 
this summer.  I will have PT evaluate her after the surgery.  I am also 
checking a B12.  She will need followup with her PCP for this.

4.  Gastroesophageal reflux disease.  Continue PPI therapy.

5.  Hyperlipidemia.  Continue statin therapy.

6.  History of asthma.  Continue p.r.n. nebs, although this is well controlled.

7.  Osteoporosis.  Continue to follow with PCP.

8.  DVT prophylaxis:  We will go ahead and continue heparin subcu.

9.  Code status:  Full code.

10.  Fluids, electrolytes, and nutrition:  The patient can have a regular diet 
and should be n.p.o. after midnight.

 

TIME SPENT:  Time spent on admission was 60 minutes, greater than half the time 
was spent face-to-face with the patient obtaining my history and physical, 
other half of the time was spent going over the plan of care with the patient 
and implementing the plan of care.

 

I did discuss the plan of care with my attending, Dr. Cowart; she is in agreement.

 

 ____________________________________ COLEMAN WELLS NP

 

[id number] #: [id number]

CARLOS

## 2018-07-19 NOTE — RAD
HISTORY: fall, left-sided pain



COMPARISONS: None



VIEWS: 4, Frontal view of the pelvis with frontal and crosstable lateral views of the left

hip



FINDINGS:



BONE DENSITY: There is diffuse osteopenia.

BONES: There is a nondisplaced fracture of the subcapital femoral neck on the left.

JOINTS: There is mild osteoarthritis of the hips and SI joints.

ALIGNMENT: There is no dislocation. 

SOFT TISSUES: Unremarkable.



OTHER FINDINGS: None.



IMPRESSION: 

1.  NONDISPLACED FRACTURE OF THE SUBCAPITAL FEMORAL NECK ON THE LEFT.

2.  OSTEOPENIA.

3.  OSTEOARTHRITIS

## 2018-07-20 LAB
BASOPHILS # BLD AUTO: 0 10^3/UL (ref 0–0.2)
BASOPHILS # BLD AUTO: 0 10^3/UL (ref 0–0.2)
EOSINOPHIL # BLD AUTO: 0 10^3/UL (ref 0–0.6)
EOSINOPHIL # BLD AUTO: 0.1 10^3/UL (ref 0–0.6)
HCT VFR BLD AUTO: 36 % (ref 35–47)
HCT VFR BLD AUTO: 38 % (ref 35–47)
HGB BLD-MCNC: 12.2 G/DL (ref 12–16)
HGB BLD-MCNC: 12.7 G/DL (ref 12–16)
INR PPP/BLD: 1.02 (ref 0.77–1.02)
LYMPHOCYTES # BLD AUTO: 0.8 10^3/UL (ref 1–4.8)
LYMPHOCYTES # BLD AUTO: 1.1 10^3/UL (ref 1–4.8)
MCH RBC QN AUTO: 29 PG (ref 27–31)
MCH RBC QN AUTO: 29 PG (ref 27–31)
MCHC RBC AUTO-ENTMCNC: 34 G/DL (ref 31–36)
MCHC RBC AUTO-ENTMCNC: 34 G/DL (ref 31–36)
MCV RBC AUTO: 84 FL (ref 80–97)
MCV RBC AUTO: 84 FL (ref 80–97)
MONOCYTES # BLD AUTO: 1 10^3/UL (ref 0–0.8)
MONOCYTES # BLD AUTO: 1.1 10^3/UL (ref 0–0.8)
NEUTROPHILS # BLD AUTO: 4.6 10^3/UL (ref 1.5–7.7)
NEUTROPHILS # BLD AUTO: 9.8 10^3/UL (ref 1.5–7.7)
NRBC # BLD AUTO: 0 10^3/UL
NRBC # BLD AUTO: 0 10^3/UL
NRBC BLD QL AUTO: 0
NRBC BLD QL AUTO: 0.1
PLATELET # BLD AUTO: 160 10^3/UL (ref 150–450)
PLATELET # BLD AUTO: 169 10^3/UL (ref 150–450)
RBC # BLD AUTO: 4.28 10^6/UL (ref 4–5.4)
RBC # BLD AUTO: 4.47 10^6/UL (ref 4–5.4)
WBC # BLD AUTO: 11.7 10^3/UL (ref 3.5–10.8)
WBC # BLD AUTO: 6.8 10^3/UL (ref 3.5–10.8)

## 2018-07-20 PROCEDURE — 0SRS019 REPLACEMENT OF LEFT HIP JOINT, FEMORAL SURFACE WITH METAL SYNTHETIC SUBSTITUTE, CEMENTED, OPEN APPROACH: ICD-10-PCS | Performed by: ORTHOPAEDIC SURGERY

## 2018-07-20 RX ADMIN — MORPHINE SULFATE PRN MG: 4 INJECTION INTRAVENOUS at 12:34

## 2018-07-20 RX ADMIN — MAGNESIUM HYDROXIDE SCH ML: 400 SUSPENSION ORAL at 21:51

## 2018-07-20 RX ADMIN — Medication SCH: at 07:44

## 2018-07-20 RX ADMIN — SODIUM CHLORIDE SCH MLS/HR: 900 IRRIGANT IRRIGATION at 09:39

## 2018-07-20 RX ADMIN — MAGNESIUM HYDROXIDE SCH: 400 SUSPENSION ORAL at 07:45

## 2018-07-20 RX ADMIN — OMEPRAZOLE SCH: 20 CAPSULE, DELAYED RELEASE ORAL at 07:45

## 2018-07-20 RX ADMIN — DOCUSATE SODIUM SCH: 100 CAPSULE, LIQUID FILLED ORAL at 07:45

## 2018-07-20 RX ADMIN — OXYCODONE HYDROCHLORIDE AND ACETAMINOPHEN PRN TAB: 5; 325 TABLET ORAL at 05:29

## 2018-07-20 RX ADMIN — OXYCODONE HYDROCHLORIDE AND ACETAMINOPHEN PRN TAB: 5; 325 TABLET ORAL at 00:37

## 2018-07-20 RX ADMIN — ATORVASTATIN CALCIUM SCH MG: 20 TABLET, FILM COATED ORAL at 21:51

## 2018-07-20 RX ADMIN — DOCUSATE SODIUM SCH MG: 100 CAPSULE, LIQUID FILLED ORAL at 21:51

## 2018-07-20 RX ADMIN — MORPHINE SULFATE PRN MG: 4 INJECTION INTRAVENOUS at 02:33

## 2018-07-20 RX ADMIN — MORPHINE SULFATE PRN MG: 4 INJECTION INTRAVENOUS at 08:54

## 2018-07-20 NOTE — OP
Operative Report - Blank





- Operative Report


Date of Operation: 07/20/18


Note: 


PATIENT: Melissa Luis





YOB: 1945





DATE OF SURGERY: 7/20/2018





SURGEON: Anthony Stiles MD





ASSISTANT: Barron Thapa MD and NATHANAEL Morataya, whos assistance was 

necessary for positioning, retraction, help with instrumentation, and closure.





ANESTHESIOLOGIST: Dr. Brown





PREOPERATIVE DIAGNOSIS: Left displaced femoral neck fracture.


 


POSTOPERATIVE DIAGNOSIS:  Left displaced femoral neck fracture.


 


PROCEDURE:  Left hip hemiarthroplasty


 


ANESTHESIA:  GETA


 


IMPLANTS:  Patricio accolade size 6 stem, size 50mm bipolar head





ESTIMATED BLOOD LOSS:  100cc


 


SPECIMENS:   None.


 


DRAIN: None





TOURNIQUET TIME: None.





COMPLICATIONS:  None.


 


STATUS:  Stable from the operating room to the recovery room and then 

readmitted to the floor.


 


INDICATIONS FOR PROCEDURE:  Melissa sustained a fall with the above-mentioned 

injury. Both operative and non-operative treatment alternatives were reviewed. 

Further, the nature and risks of surgery were reviewed in careful detail, in 

the office as well as the pre-operative holding area. Our discussions regarding 

the risks of surgery included, but were not limited to, bleeding, need for 

blood transfusion, infection, wound problems, implant failure, fracture, nerve 

injury, neuroma, RSD, persistent symptoms, need for further surgery and even 

the remote chance of catastrophic complication, including loss of limb or death.


 


DESCRIPTION OF PROCEDURE:  The patient was seen in the preoperative holding 

unit and informed written consent was obtained. The appropriate extremity was 

marked. The patient was then brought to the operating room anesthesia was 

induced. The patient was then carefully positioned on the fracture table and 

all bony prominences were padded with great care. A chlorhexidine-based pre-

scrub was performed followed by prep and drape in standard sterile fashion with 

ChloraPrep. A surgical safety pause was then conducted in which we confirmed 

the appropriate patient, extremity, planned procedure, availability of equipment

, indication and administration of prophylactic antibiotics, and DVT 

prophylaxis in the form of a compression boot on the non-surgical extremity.


 


An approximately 15-cm incision was made overlying the lateral aspect of the 

hip. An anterolateral approach (modified Hardinge approach) to the hip was 

utilized.  Dissection was carried down to the level of the iliotibial band and 

this was divided in line with the fibers. A Charnley retractor was then placed. 

I opened up the bursa and then split the gluteus medius at the junction of the 

superior 2/3 and inferior 1/3 of the muscle.  I raised the medius as a flap and 

then held this underneath the Charnley retractor. I then performed a 

capsulotomy in line with the femoral neck. External rotation of the leg exposed 

the femoral neck. The anterior subperiosteal flap was carried down to the level 

of the lesser trochanter, which was palpated. I then used the cutting guide to 

determine the level of the femoral neck cut. This was made approximately 1 

fingerbreadth above the level of the lesser trochanter. I then utilized a saw 

to make the bone cut and removed the residual femoral neck. I made a counter 

cut to avoid injuring the greater trochanter. I then removed the femoral head 

from the acetabulum. This was sized to 50 mm. The overall cartilage within the 

acetabulum appeared to be in good condition. The ligamentum teres was removed. 

I trialed a 50 mm head and found this to have a nice fit within the acetabulum. 

We then copiously irrigated.





We then positioned the leg in the leg bag anteriorly. This gave good access to 

the femur. A cookie cutter osteotome was utilized, followed by a starter awl. I 

utilized hand reamers and then broached the femur. The size 6 cemented reamer 

and broach was found to have a snug fit. The implant was then trialed and 

reduced nicely. The hip was stable throughout range of motion and the leg 

lengths were noted to be nearly equivalent. The trials were then removed and 

the final implants were opened on the back table.





At this point, we irrigated copiously. I placed a lap sponge within the 

acetabulum. I irrigated the canal utilizing pulsatile lavage. I then utilized 

the sponges down the femoral canal to clean the canal. I then placed a cement 

plug approximately 2 cm distal to the anticipated tip of the stem. I placed a 

centralizer around the tip of the stem. Cement was then mixed on the back 

table. When this was appropriately hardened, cement was inserted into the 

canal. I then placed the stem into position, with care taken to fit this into 

the appropriate position to the level I had placed the broach. I also ensured 

the version was appropriate, to match the native anteversion. Once the cement 

hardened, we removed the lap sponge from the acetabulum. We then irrigated 

copiously. We placed the head components into position and tapped these into 

place. We then reduced the hip. Leg lengths were again checked and were noted 

to be nearly symmetric. There was excellent range of motion with no instability 

with external rotation and extension. We could get the leg straight and could 

also flex to 90 degrees without difficulty.





We then irrigated copiously and closed utilizing #5 Ethibond for the capsule 

and the gluteus medius, #1 Vicryl for the iliotibial band, 2-0 Vicryl for the 

deep layer, 3-0 Monocryl for the subdermal layer, and staples for the skin. A 

sterile dressing was then applied.





The patient was then carefully turned back into the supine position. Leg 

lengths were noted to be nearly equal. The patient was then transferred back to 

recovery room in stable condition. 





There were no complications. All needle and sponge counts were correct at the 

end of the case. 





ATTESTATION:  I attest I was present and scrubbed and performed the entire 

procedure myself.


 


POSTOPERATIVE PLAN:  The patient will be admitted back to the medical service 

postoperatively and may be weightbearing as tolerated and will work with 

physical therapy.  DVT prophylaxis with Lovenox 40 mg sc daily for 1 month is 

recommended. Anterior hip precautions will be maintained for 1 month 

postoperatively.

## 2018-07-20 NOTE — PN
Hospitalist Progress Note


Date of Service: 07/20/18





Responded to cat call for syncope.  Upon arrival patient is awake and alert 

orient times three, she remember me from admission.. Denies chest pain or sob.  

States she was trying to get up with nurse was having hip pain and then the 

next thing she remembers is waking up in nbed.  Denies chest pain now or sob.  

Denies lightheadedness.  Lungs cta, heart sounds s1 s2 RRR, neuro no focal 

deficits, a and o times three, suspect vasovagal, ekg stable, will checks trops 

cbc, bmp orthostats, bp no 140/60, hr 100, 98 on 2 lnc, will place on tele,

## 2018-07-20 NOTE — PN
Hospitalist Progress Note


Date of Service: 07/20/18





Called to bedside for rapid heart rate.  Pt pulse 180 and irregular.  Pt with 

now symptoms.  EKg consistent with afib rvr, dilt 15 mg pushed ordered followed 

by drip transferr to icu.  Family updated.  Attending updated.  Checking mag 

and tsh willl hold a/c given surgery today will ordered echo.  Consider cards 

consult.

## 2018-07-20 NOTE — PN
Subjective


Date of Service: 07/20/18


Interval History: 





Pt feels well, left hp hurts when she moves. Also left clavicle fx hurts with 

movement





Objective


Active Medications: 








Acetaminophen (Tylenol Tab*)  650 mg PO Q4H PRN


   PRN Reason: FEVER/PAIN


Albuterol (Ventolin 2.5 Mg/3 Ml Neb.Sol*)  2.5 mg INH Q2H PRN


   PRN Reason: SOB/WHEEZING


Atorvastatin Calcium (Lipitor*)  20 mg PO BEDTIME [facility]; Protocol


   Last Admin: 07/19/18 20:28 Dose:  Not Given


Cholecalciferol (Vitamin D Tab*)  4,000 units PO DAILY [facility]


   Last Admin: 07/20/18 07:44 Dose:  Not Given


Docusate Sodium (Colace Cap*)  100 mg PO BID [facility]


   Last Admin: 07/20/18 07:45 Dose:  Not Given


Sodium Chloride (Ns 0.9% 1000 Ml*)  1,000 mls @ 100 mls/hr IV PER RATE [facility]


   Last Admin: 07/19/18 23:44 Dose:  100 mls/hr


Magnesium Hydroxide (Milk Of Magnesia Liq*)  30 ml PO BID [facility]


   Last Admin: 07/20/18 07:45 Dose:  Not Given


Magnesium Hydroxide (Milk Of Magnesia Liq*)  30 ml PO BID PRN


   PRN Reason: CONSTIPATION


Morphine Sulfate (Morphine Vial*)  2 mg IV Q4H PRN


   PRN Reason: PAIN - BREAKTHROUGH (SEVERE)


   Last Admin: 07/20/18 02:33 Dose:  2 mg


Omeprazole (Prilosec Cap*)  20 mg PO DAILY [facility]


   Last Admin: 07/20/18 07:45 Dose:  Not Given


Ondansetron HCl (Zofran Inj*)  4 mg IV Q6H PRN


   PRN Reason: NAUSEA


Oxycodone/Acetaminophen (Percocet 5/325 Tab*)  1 tab PO Q4H PRN


   PRN Reason: PAIN - MODERATE


Oxycodone/Acetaminophen (Percocet 5/325 Tab*)  2 tab PO Q4H PRN


   PRN Reason: PAIN - SEVERE


   Last Admin: 07/20/18 05:29 Dose:  2 tab


Senna (Senokot Tab*)  1 tab PO BEDTIME PRN


   PRN Reason: CONSTIPATION


Tramadol HCl (Ultram*)  50 mg PO Q8H PRN


   PRN Reason: PAIN


   Last Admin: 07/19/18 15:28 Dose:  50 mg








 Vital Signs - 8 hr











  07/20/18 07/20/18 07/20/18





  00:37 02:10 02:13


 


Temperature   


 


Pulse Rate  96 


 


Respiratory 18 14 16





Rate   


 


Blood Pressure   





(mmHg)   


 


O2 Sat by Pulse  93 





Oximetry   














  07/20/18 07/20/18 07/20/18





  02:33 03:40 05:29


 


Temperature  98.1 F 


 


Pulse Rate  97 


 


Respiratory 18 19 18





Rate   


 


Blood Pressure  122/63 





(mmHg)   


 


O2 Sat by Pulse  94 





Oximetry   














  07/20/18 07/20/18





  07:41 07:44


 


Temperature  98.2 F


 


Pulse Rate  97


 


Respiratory 15 16





Rate  


 


Blood Pressure  146/78





(mmHg)  


 


O2 Sat by Pulse  95





Oximetry  











Oxygen Devices in Use Now: None


Appearance: 72 yo F in nAD, AAOx3


Eyes: No Scleral Icterus, PERRLA


Ears/Nose/Mouth/Throat: NL Teeth, Lips, Gums, Mucous Membranes Moist


Neck: NL Appearance and Movements; NL JVP, Trachea Midline


Respiratory: Symmetrical Chest Expansion and Respiratory Effort, Clear to 

Auscultation


Cardiovascular: NL Sounds; No Murmurs; No JVD, RRR


Abdominal: NL Sounds; No Tenderness; No Distention


Lymphatic: No Cervical Adenopathy


Extremities: No Edema, No Clubbing, Cyanosis, - - left leg shortened


Skin: No Nodules or Sclerosis, - - left shoulder ecchymosis


Neurological: Alert and Oriented x 3, NL Muscle Strength and Tone


Result Diagrams: 


 07/20/18 05:11





 07/20/18 05:11


Additional Lab and Data: 


 Lab Results











  07/19/18 07/19/18 07/19/18 Range/Units





  09:48 09:54 09:54 


 


WBC   6.5   (3.5-10.8)  10^3/ul


 


RBC   4.95   (4.00-5.40)  10^6/ul


 


Hgb   14.1   (12.0-16.0)  g/dl


 


Hct   42   (35-47)  %


 


MCV   85   (80-97)  fL


 


MCH   29   (27-31)  pg


 


MCHC   34   (31-36)  g/dl


 


RDW   14   (10.5-15)  %


 


Plt Count   193   (150-450)  10^3/ul


 


MPV   7.8   (7.4-10.4)  um3


 


Neut % (Auto)   69.9   (38-83)  %


 


Lymph % (Auto)   13.8 L   (25-47)  %


 


Mono % (Auto)   14.2 H   (0-7)  %


 


Eos % (Auto)   1.4   (0-6)  %


 


Baso % (Auto)   0.7   (0-2)  %


 


Absolute Neuts (auto)   4.5   (1.5-7.7)  10^3/ul


 


Absolute Lymphs (auto)   0.9 L   (1.0-4.8)  10^3/ul


 


Absolute Monos (auto)   0.9 H   (0-0.8)  10^3/ul


 


Absolute Eos (auto)   0.1   (0-0.6)  10^3/ul


 


Absolute Basos (auto)   0   (0-0.2)  10^3/ul


 


Absolute Nucleated RBC   0   10^3/ul


 


Nucleated RBC %   0.3   


 


INR (Anticoag Therapy)    1.02  (0.77-1.02)  


 


APTT    45.1 H  (26.0-36.3)  seconds


 


Sodium     (135-145)  mmol/L


 


Potassium     (3.5-5.0)  mmol/L


 


Chloride     (101-111)  mmol/L


 


Carbon Dioxide     (22-32)  mmol/L


 


Anion Gap     (2-11)  mmol/L


 


BUN     (6-24)  mg/dL


 


Creatinine     (0.51-0.95)  mg/dL


 


Est GFR ( Amer)     (>60)  


 


Est GFR (Non-Af Amer)     (>60)  


 


BUN/Creatinine Ratio     (8-20)  


 


Glucose     ()  mg/dL


 


Lactic Acid     (0.5-2.0)  mmol/L


 


Calcium     (8.6-10.3)  mg/dL


 


Total Bilirubin     (0.2-1.0)  mg/dL


 


AST     (13-39)  U/L


 


ALT     (7-52)  U/L


 


Alkaline Phosphatase     ()  U/L


 


Troponin I     (<0.04)  ng/mL


 


Total Protein     (6.4-8.9)  g/dL


 


Albumin     (3.2-5.2)  g/dL


 


Globulin     (2-4)  g/dL


 


Albumin/Globulin Ratio     (1-3)  


 


Urine Color  Straw    


 


Urine Appearance  Clear    


 


Urine pH  7.0    (5-9)  


 


Ur Specific Gravity  1.003 L    (1.010-1.030)  


 


Urine Protein  Negative    (Negative)  


 


Urine Ketones  1+ A    (Negative)  


 


Urine Blood  Negative    (Negative)  


 


Urine Nitrate  Negative    (Negative)  


 


Urine Bilirubin  Negative    (Negative)  


 


Urine Urobilinogen  Negative    (Negative)  


 


Ur Leukocyte Esterase  Negative    (Negative)  


 


Urine Glucose  Negative    (Negative)  














  07/19/18 07/19/18 Range/Units





  09:54 09:54 


 


WBC    (3.5-10.8)  10^3/ul


 


RBC    (4.00-5.40)  10^6/ul


 


Hgb    (12.0-16.0)  g/dl


 


Hct    (35-47)  %


 


MCV    (80-97)  fL


 


MCH    (27-31)  pg


 


MCHC    (31-36)  g/dl


 


RDW    (10.5-15)  %


 


Plt Count    (150-450)  10^3/ul


 


MPV    (7.4-10.4)  um3


 


Neut % (Auto)    (38-83)  %


 


Lymph % (Auto)    (25-47)  %


 


Mono % (Auto)    (0-7)  %


 


Eos % (Auto)    (0-6)  %


 


Baso % (Auto)    (0-2)  %


 


Absolute Neuts (auto)    (1.5-7.7)  10^3/ul


 


Absolute Lymphs (auto)    (1.0-4.8)  10^3/ul


 


Absolute Monos (auto)    (0-0.8)  10^3/ul


 


Absolute Eos (auto)    (0-0.6)  10^3/ul


 


Absolute Basos (auto)    (0-0.2)  10^3/ul


 


Absolute Nucleated RBC    10^3/ul


 


Nucleated RBC %    


 


INR (Anticoag Therapy)    (0.77-1.02)  


 


APTT    (26.0-36.3)  seconds


 


Sodium  141   (135-145)  mmol/L


 


Potassium  3.6   (3.5-5.0)  mmol/L


 


Chloride  102   (101-111)  mmol/L


 


Carbon Dioxide  29   (22-32)  mmol/L


 


Anion Gap  10   (2-11)  mmol/L


 


BUN  6   (6-24)  mg/dL


 


Creatinine  0.63   (0.51-0.95)  mg/dL


 


Est GFR ( Amer)  111.8   (>60)  


 


Est GFR (Non-Af Amer)  92.4   (>60)  


 


BUN/Creatinine Ratio  9.5   (8-20)  


 


Glucose  107 H   ()  mg/dL


 


Lactic Acid   1.0  (0.5-2.0)  mmol/L


 


Calcium  9.2   (8.6-10.3)  mg/dL


 


Total Bilirubin  1.10 H   (0.2-1.0)  mg/dL


 


AST  15   (13-39)  U/L


 


ALT  11   (7-52)  U/L


 


Alkaline Phosphatase  49   ()  U/L


 


Troponin I  0.00   (<0.04)  ng/mL


 


Total Protein  6.5   (6.4-8.9)  g/dL


 


Albumin  3.9   (3.2-5.2)  g/dL


 


Globulin  2.6   (2-4)  g/dL


 


Albumin/Globulin Ratio  1.5   (1-3)  


 


Urine Color    


 


Urine Appearance    


 


Urine pH    (5-9)  


 


Ur Specific Gravity    (1.010-1.030)  


 


Urine Protein    (Negative)  


 


Urine Ketones    (Negative)  


 


Urine Blood    (Negative)  


 


Urine Nitrate    (Negative)  


 


Urine Bilirubin    (Negative)  


 


Urine Urobilinogen    (Negative)  


 


Ur Leukocyte Esterase    (Negative)  


 


Urine Glucose    (Negative)  














Assess/Plan/Problems-Billing


Assessment: 72 yo F with h/o tachycardia presents with mechanical left hip fx 

and left clavicle fx











- Patient Problems


(1) Closed left hip fracture


Comment: to OR today with Dr. Thapa   





(2) Closed left clavicular fracture


Comment: d/w DR. Thapa, OK to use sling for comfort   





(3) Tachycardia


Comment: in sinus tachy


evaluated by cardiology 5 yrs ago with a stress test.    





(4) DVT prophylaxis


Comment: scd's pre op   


Status and Disposition: 


inpatient

## 2018-07-20 NOTE — RAD
INDICATION: Left hip ORIF



COMPARISON: July 19, 2018

 

TECHNIQUE: An AP view of the pelvis and AP views of the hip in neutral and abducted

position were obtained 



FINDINGS: 



There is interval left hip arthroplasty. The prosthesis appears normally seated. There are

laterally placed skin staples.



IMPRESSION: INTERVAL LEFT HIP ARTHROPLASTY.

## 2018-07-21 LAB
BASOPHILS # BLD AUTO: 0 10^3/UL (ref 0–0.2)
EOSINOPHIL # BLD AUTO: 0 10^3/UL (ref 0–0.6)
HCT VFR BLD AUTO: 34 % (ref 35–47)
HGB BLD-MCNC: 11.4 G/DL (ref 12–16)
LYMPHOCYTES # BLD AUTO: 0.4 10^3/UL (ref 1–4.8)
MCH RBC QN AUTO: 29 PG (ref 27–31)
MCHC RBC AUTO-ENTMCNC: 34 G/DL (ref 31–36)
MCV RBC AUTO: 85 FL (ref 80–97)
MONOCYTES # BLD AUTO: 1 10^3/UL (ref 0–0.8)
NEUTROPHILS # BLD AUTO: 10.2 10^3/UL (ref 1.5–7.7)
NRBC # BLD AUTO: 0 10^3/UL
NRBC BLD QL AUTO: 0.1
PLATELET # BLD AUTO: 163 10^3/UL (ref 150–450)
RBC # BLD AUTO: 4.01 10^6/UL (ref 4–5.4)
WBC # BLD AUTO: 11.6 10^3/UL (ref 3.5–10.8)

## 2018-07-21 RX ADMIN — TRAMADOL HYDROCHLORIDE PRN MG: 50 TABLET, FILM COATED ORAL at 20:28

## 2018-07-21 RX ADMIN — Medication SCH UNITS: at 08:42

## 2018-07-21 RX ADMIN — ENOXAPARIN SODIUM SCH MG: 40 INJECTION SUBCUTANEOUS at 09:12

## 2018-07-21 RX ADMIN — TRAMADOL HYDROCHLORIDE PRN MG: 50 TABLET, FILM COATED ORAL at 02:08

## 2018-07-21 RX ADMIN — MAGNESIUM SULFATE HEPTAHYDRATE ONE MLS/HR: 500 INJECTION, SOLUTION INTRAMUSCULAR; INTRAVENOUS at 11:11

## 2018-07-21 RX ADMIN — ATORVASTATIN CALCIUM SCH MG: 20 TABLET, FILM COATED ORAL at 20:28

## 2018-07-21 RX ADMIN — METOPROLOL TARTRATE SCH MG: 25 TABLET, FILM COATED ORAL at 08:43

## 2018-07-21 RX ADMIN — MAGNESIUM HYDROXIDE SCH: 400 SUSPENSION ORAL at 20:23

## 2018-07-21 RX ADMIN — OMEPRAZOLE SCH MG: 20 CAPSULE, DELAYED RELEASE ORAL at 08:43

## 2018-07-21 RX ADMIN — MAGNESIUM SULFATE HEPTAHYDRATE ONE MLS/HR: 500 INJECTION, SOLUTION INTRAMUSCULAR; INTRAVENOUS at 09:12

## 2018-07-21 RX ADMIN — DOCUSATE SODIUM SCH MG: 100 CAPSULE, LIQUID FILLED ORAL at 08:43

## 2018-07-21 RX ADMIN — METOPROLOL TARTRATE SCH MG: 25 TABLET, FILM COATED ORAL at 20:28

## 2018-07-21 RX ADMIN — MAGNESIUM HYDROXIDE SCH ML: 400 SUSPENSION ORAL at 08:42

## 2018-07-21 RX ADMIN — DOCUSATE SODIUM SCH: 100 CAPSULE, LIQUID FILLED ORAL at 20:23

## 2018-07-21 NOTE — PN
Progress Note





- Progress Note


Date of Service: 07/21/18


SOAP: 


Subjective:


* [Pt was transferred to ICU last night secondary to Afib with rvr.   She says 

she is feeling better today. Reports L hip pain manageable with po meds.  

Minimal discomfort at both L jip and L clavicle while at rest.   Has not been 

bearing weight on LUE secondary to clavicle fx, but feels like she could use L 

arm a bit to steady herself.  Denies CP, SO, dizziness.]








Objective:


[A and O x 3, NAD.  Sitting up in bed eating breakfast.  Daughter at bedside


L hip dressing C/D/I.  No visible ecchymosis anywhere along length of leg.  No 

TTP distally.  Calf soft, NT.


Gross motor function and NV function intact





Vital Signs:











Temp Pulse Resp BP Pulse Ox


 


 98.5 F   106   20   125/67   96 


 


 07/21/18 08:00  07/21/18 09:00  07/21/18 09:00  07/21/18 09:00  07/21/18 09:00











 Laboratory Results - last 24 hr











  07/20/18 07/20/18 07/21/18





  22:18 22:18 01:21


 


WBC   11.7 H 


 


RBC   4.47 


 


Hgb   12.7 


 


Hct   38 


 


MCV   84 


 


MCH   29 


 


MCHC   34 


 


RDW   13 


 


Plt Count   169 


 


MPV   7.8 


 


Neut % (Auto)   83.6 H 


 


Lymph % (Auto)   6.7 L 


 


Mono % (Auto)   9.4 H 


 


Eos % (Auto)   0.1 


 


Baso % (Auto)   0.2 


 


Absolute Neuts (auto)   9.8 H 


 


Absolute Lymphs (auto)   0.8 L 


 


Absolute Monos (auto)   1.1 H 


 


Absolute Eos (auto)   0 


 


Absolute Basos (auto)   0 


 


Absolute Nucleated RBC   0 


 


Nucleated RBC %   0.1 


 


Sodium  134 L  


 


Potassium  4.3  


 


Chloride  101  


 


Carbon Dioxide  25  


 


Anion Gap  8  


 


BUN  5 L  


 


Creatinine  0.55  


 


Est GFR ( Amer)  130.7  


 


Est GFR (Non-Af Amer)  108.0  


 


BUN/Creatinine Ratio  9.1  


 


Glucose  145 H  


 


Calcium  8.4 L  


 


Magnesium  1.7 L  


 


Troponin I  0.00   0.00














  07/21/18 07/21/18





  05:00 05:00


 


WBC  11.6 H 


 


RBC  4.01 


 


Hgb  11.4 L 


 


Hct  34 L 


 


MCV  85 


 


MCH  29 


 


MCHC  34 


 


RDW  13 


 


Plt Count  163 


 


MPV  7.5 


 


Neut % (Auto)  87.9 H 


 


Lymph % (Auto)  3.3 L 


 


Mono % (Auto)  8.5 H 


 


Eos % (Auto)  0.1 


 


Baso % (Auto)  0.2 


 


Absolute Neuts (auto)  10.2 H 


 


Absolute Lymphs (auto)  0.4 L 


 


Absolute Monos (auto)  1.0 H 


 


Absolute Eos (auto)  0 


 


Absolute Basos (auto)  0 


 


Absolute Nucleated RBC  0 


 


Nucleated RBC %  0.1 


 


Sodium   134 L


 


Potassium   3.6


 


Chloride   103


 


Carbon Dioxide   25


 


Anion Gap   6


 


BUN   6


 


Creatinine   0.47 L


 


Est GFR ( Amer)   156.7


 


Est GFR (Non-Af Amer)   129.5


 


BUN/Creatinine Ratio   12.8


 


Glucose   136 H


 


Calcium   7.7 L


 


Magnesium   2.5


 


Troponin I   0.01








]








Assessment:


[s/p L hip hemiarthroplasty POD #1


New onset afib with rvr last night]








Plan:


[Pain management


Con't PT/OT - may WBAT on LLE and on LUE as well


Lovenox for DVT prophylaxis


Will change dressing tomorrow


Medicine following]

## 2018-07-22 LAB
BASOPHILS # BLD AUTO: 0 10^3/UL (ref 0–0.2)
EOSINOPHIL # BLD AUTO: 0 10^3/UL (ref 0–0.6)
HCT VFR BLD AUTO: 31 % (ref 35–47)
HGB BLD-MCNC: 10.5 G/DL (ref 12–16)
LYMPHOCYTES # BLD AUTO: 0.6 10^3/UL (ref 1–4.8)
MCH RBC QN AUTO: 28 PG (ref 27–31)
MCHC RBC AUTO-ENTMCNC: 34 G/DL (ref 31–36)
MCV RBC AUTO: 84 FL (ref 80–97)
MONOCYTES # BLD AUTO: 1.4 10^3/UL (ref 0–0.8)
NEUTROPHILS # BLD AUTO: 10.5 10^3/UL (ref 1.5–7.7)
NRBC # BLD AUTO: 0 10^3/UL
NRBC BLD QL AUTO: 0
PLATELET # BLD AUTO: 174 10^3/UL (ref 150–450)
RBC # BLD AUTO: 3.7 10^6/UL (ref 4–5.4)
WBC # BLD AUTO: 12.6 10^3/UL (ref 3.5–10.8)

## 2018-07-22 RX ADMIN — MAGNESIUM HYDROXIDE SCH ML: 400 SUSPENSION ORAL at 21:39

## 2018-07-22 RX ADMIN — TRAMADOL HYDROCHLORIDE PRN MG: 50 TABLET, FILM COATED ORAL at 08:04

## 2018-07-22 RX ADMIN — DILTIAZEM HYDROCHLORIDE SCH MG: 30 TABLET, FILM COATED ORAL at 21:39

## 2018-07-22 RX ADMIN — Medication SCH UNITS: at 08:03

## 2018-07-22 RX ADMIN — POTASSIUM CHLORIDE SCH MLS/HR: 200 INJECTION, SOLUTION INTRAVENOUS at 21:39

## 2018-07-22 RX ADMIN — MAGNESIUM HYDROXIDE SCH ML: 400 SUSPENSION ORAL at 08:05

## 2018-07-22 RX ADMIN — METOPROLOL TARTRATE SCH MG: 25 TABLET, FILM COATED ORAL at 08:05

## 2018-07-22 RX ADMIN — DOCUSATE SODIUM SCH MG: 100 CAPSULE, LIQUID FILLED ORAL at 08:04

## 2018-07-22 RX ADMIN — ENOXAPARIN SODIUM SCH MG: 40 INJECTION SUBCUTANEOUS at 08:06

## 2018-07-22 RX ADMIN — POTASSIUM CHLORIDE SCH MLS/HR: 200 INJECTION, SOLUTION INTRAVENOUS at 20:09

## 2018-07-22 RX ADMIN — SOTALOL HYDROCHLORIDE SCH MG: 80 TABLET ORAL at 21:48

## 2018-07-22 RX ADMIN — MAGNESIUM HYDROXIDE SCH: 400 SUSPENSION ORAL at 08:08

## 2018-07-22 RX ADMIN — OMEPRAZOLE SCH MG: 20 CAPSULE, DELAYED RELEASE ORAL at 08:06

## 2018-07-22 RX ADMIN — DOCUSATE SODIUM SCH MG: 100 CAPSULE, LIQUID FILLED ORAL at 20:26

## 2018-07-22 RX ADMIN — ATORVASTATIN CALCIUM SCH MG: 20 TABLET, FILM COATED ORAL at 20:26

## 2018-07-22 NOTE — PN
Progress Note





- Progress Note


Date of Service: 07/22/18


SOAP: 


Subjective:


resting comfortably with minimal complaints of left hip pain








Objective:


 Vital Signs











Temp Pulse Resp BP Pulse Ox


 


 98.5 F   106   18   148/60   98 


 


 07/22/18 07:54  07/22/18 07:54  07/22/18 08:04  07/22/18 07:54  07/22/18 08:00








 Laboratory Last Values











WBC  12.6 10^3/ul (3.5-10.8)  H  07/22/18  06:01    


 


RBC  3.70 10^6/ul (4.00-5.40)  L  07/22/18  06:01    


 


Hgb  10.5 g/dl (12.0-16.0)  L  07/22/18  06:01    


 


Hct  31 % (35-47)  L  07/22/18  06:01    


 


MCV  84 fL (80-97)   07/22/18  06:01    


 


MCH  28 pg (27-31)   07/22/18  06:01    


 


MCHC  34 g/dl (31-36)   07/22/18  06:01    


 


RDW  14 % (10.5-15)   07/22/18  06:01    


 


Plt Count  174 10^3/ul (150-450)   07/22/18  06:01    


 


MPV  7.9 um3 (7.4-10.4)   07/22/18  06:01    


 


Neut % (Auto)  83.4 % (38-83)  H  07/22/18  06:01    


 


Lymph % (Auto)  4.8 % (25-47)  L  07/22/18  06:01    


 


Mono % (Auto)  11.3 % (0-7)  H  07/22/18  06:01    


 


Eos % (Auto)  0.3 % (0-6)   07/22/18  06:01    


 


Baso % (Auto)  0.2 % (0-2)   07/22/18  06:01    


 


Absolute Neuts (auto)  10.5 10^3/ul (1.5-7.7)  H  07/22/18  06:01    


 


Absolute Lymphs (auto)  0.6 10^3/ul (1.0-4.8)  L  07/22/18  06:01    


 


Absolute Monos (auto)  1.4 10^3/ul (0-0.8)  H  07/22/18  06:01    


 


Absolute Eos (auto)  0 10^3/ul (0-0.6)   07/22/18  06:01    


 


Absolute Basos (auto)  0 10^3/ul (0-0.2)   07/22/18  06:01    


 


Absolute Nucleated RBC  0 10^3/ul  07/22/18  06:01    


 


Nucleated RBC %  0   07/22/18  06:01    


 


INR (Anticoag Therapy)  1.02  (0.77-1.02)   07/20/18  05:11    


 


APTT  45.1 seconds (26.0-36.3)  H  07/19/18  09:54    


 


Sodium  133 mmol/L (135-145)  L  07/22/18  06:01    


 


Potassium  3.8 mmol/L (3.5-5.0)   07/22/18  06:01    


 


Chloride  100 mmol/L (101-111)  L  07/22/18  06:01    


 


Carbon Dioxide  27 mmol/L (22-32)   07/22/18  06:01    


 


Anion Gap  6 mmol/L (2-11)   07/22/18  06:01    


 


BUN  6 mg/dL (6-24)   07/22/18  06:01    


 


Creatinine  0.52 mg/dL (0.51-0.95)   07/22/18  06:01    


 


Est GFR ( Amer)  139.5  (>60)   07/22/18  06:01    


 


Est GFR (Non-Af Amer)  115.3  (>60)   07/22/18  06:01    


 


BUN/Creatinine Ratio  11.5  (8-20)   07/22/18  06:01    


 


Glucose  130 mg/dL ()  H  07/22/18  06:01    


 


Lactic Acid  1.0 mmol/L (0.5-2.0)   07/19/18  09:54    


 


Calcium  8.2 mg/dL (8.6-10.3)  L  07/22/18  06:01    


 


Magnesium  2.1 mg/dL (1.9-2.7)   07/22/18  06:01    


 


Total Bilirubin  1.10 mg/dL (0.2-1.0)  H  07/19/18  09:54    


 


AST  15 U/L (13-39)   07/19/18  09:54    


 


ALT  11 U/L (7-52)   07/19/18  09:54    


 


Alkaline Phosphatase  49 U/L ()   07/19/18  09:54    


 


Troponin I  0.01 ng/mL (<0.04)   07/21/18  05:00    


 


Total Protein  6.5 g/dL (6.4-8.9)   07/19/18  09:54    


 


Albumin  3.9 g/dL (3.2-5.2)   07/19/18  09:54    


 


Globulin  2.6 g/dL (2-4)   07/19/18  09:54    


 


Albumin/Globulin Ratio  1.5  (1-3)   07/19/18  09:54    


 


Vitamin B12  267 pg/mL (180-914)   07/19/18  09:54    


 


TSH  3.68 mcIU/mL (0.34-5.60)   07/19/18  09:54    


 


Urine Color  Straw   07/19/18  09:48    


 


Urine Appearance  Clear   07/19/18  09:48    


 


Urine pH  7.0  (5-9)   07/19/18  09:48    


 


Ur Specific Gravity  1.003  (1.010-1.030)  L  07/19/18  09:48    


 


Urine Protein  Negative  (Negative)   07/19/18  09:48    


 


Urine Ketones  1+  (Negative)  A  07/19/18  09:48    


 


Urine Blood  Negative  (Negative)   07/19/18  09:48    


 


Urine Nitrate  Negative  (Negative)   07/19/18  09:48    


 


Urine Bilirubin  Negative  (Negative)   07/19/18  09:48    


 


Urine Urobilinogen  Negative  (Negative)   07/19/18  09:48    


 


Ur Leukocyte Esterase  Negative  (Negative)   07/19/18  09:48    


 


Urine Glucose  Negative  (Negative)   07/19/18  09:48    








incision: c/d; dressing changed


PE:NVI








Assessment:


s/p left hip yanely; POD#2





Plan:


1) Lovenox for DVT prophylaxis


2) PT/OT-WBAT


3) hospitalist co-managing

## 2018-07-22 NOTE — PN
Progress Note





- Progress Note


Date of Service: 07/22/18


Note: 


I saw Melissa this morning.  She is on the regular hospital floor.  She is now 2 

days from her left hip hemiarthroplasty.  She reports she has no pain at the hip

, but hasn't had a chance to use it much yet.  I changed her bandage, and the 

wound looks good.  She is neurovascularly intact distally in the left lower 

extremity.  No pain with passive range of motion of the hip.





  She can be weightbearing as tolerated.  Mechanical DVT prophylaxis as well as 

Lovenox daily.  Physical therapy.





  Anthony Stiles MD

## 2018-07-22 NOTE — ECHO
Patient:      FACUNDO BOCANEGRA

Med Rec#:     [id number]            :          1945          

Date:         2018            Age:          73y                 

Account#:     [id number]          Height:       167.64 cm / 66.0 in

Accession#:   [id number]           Weight:       94.35 kg / 207.9 lbs

Sex:          F                     BSA:          2.03

Room#:        [facility]                 

Admit Date#:  2018          

Type:         Inpatient

 

Referring:    Allan Wells NP

Reading:      Perez Cruz DO

Sonographer:  Genet Gonzalez RDCS

CC:           Nick Charlton MD

______________________________________________________________________

 

Transthoracic Echocardiogram

 

Indication:

Abnormal EKG, A-fib RVR

BP:           128/56

HR:           97

Rhythm:       NSR with PACs

 

Findings     

History:

HLD and asthma. 

 

Technical Comments:

The study quality is fair.  Completed at 1130. 

 

Left Ventricle:

The left ventricular chamber size is normal. There is no left

ventricular hypertrophy.   There is normal left ventricular systolic

function. The estimated ejection fraction is 60-65%.  There is no

consistent Doppler evidence of clinically significant     diastolic

dysfunction. 

 

Left Atrium:

The left atrium is mildly dilated. 

 

Right Ventricle:

The right ventricular chamber size and systolic function are within

normal limits. 

 

Right Atrium:

The right atrial cavity size is normal. 

 

Aortic Valve:

The aortic valve is trileaflet. The aortic valve leaflets are mildly

thickened. There is a trace of aortic regurgitation. There is no

evidence of aortic stenosis. 

 

Mitral Valve:

There is mitral annular calcification. The mitral valve leaflets are

mildly thickened. There is a trace of mitral regurgitation. There is no

evidence of mitral stenosis. 

 

Tricuspid Valve:

The tricuspid valve leaflets are normal.  There is mild tricuspid

regurgitation. The right ventricular systolic pressure is estimated at

49 mmHg.  There is evidence of mild to moderate pulmonary hypertension.

There is no tricuspid stenosis. 

 

Pulmonic Valve:

The pulmonic valve appears normal. There is trace to mild pulmonic

regurgitation. There is no pulmonic stenosis.  

 

Pericardium:

There is no significant pericardial effusion. 

 

Aorta:

There is no dilatation of the ascending aorta. There is no dilatation of

the aortic arch. The aortic root is normal in size. 

 

Pulmonary Artery:

The main pulmonary artery is not well visualized. 

 

Venous:

The inferior vena cava is dilated.  There is a greater than 50%

respiratory change in the inferior vena cava dimension. 

 

Conclusions

The left ventricular chamber size is normal.

There is normal left ventricular systolic function.

The estimated ejection fraction is 60-65%. 

The left atrium is mildly dilated.

The right ventricular chamber size and systolic function are within

normal limits.

There is evidence of mild to moderate pulmonary hypertension.

No significant valvular abnormalities noted.

 

None prior available for comparison at time of interpretation.

 

Measurements     

Name                    Value         Normal Range            

RVIDd (AP) 2D           2.4 cm        (0.9 - 2.6)             

RVDdMajor (2D)          4.1 cm        (2.2 - 4.4)             

RA (A4C)W               4.4 cm        (2.9 - 4.6)             

IVSd (2D)               0.8 cm        (0.6 - 1)               

LVPWd (2D)              1 cm          (0.6 - 1)               

LVIDd (2D)              4.6 cm        (3.6 - 5.4)             

LVIDs (2D)              3.1 cm        -                        

LV FS (2D)              33 %          (25 - 45)               

Aortic Annulus          1.8 cm        (1.4 - 2.6)             

Ao root diameter (2D)   2.7 cm        (2.1 - 3.5)             

Ascending Ao            3.2 cm        (2.1 - 3.4)             

Aortic arch             1.8 cm        (1.8 - 3.4)             

LA dimension (AP) 2D    2.9 cm        (2.3 - 3.8)             

LAd ISD 4CH             6 cm          (2.9 - 5.3)             

LA ISD 4CH W            4.9 cm        (2.5 - 4.5)             

 

Name                    Value         Normal Range            

LA ESV SP 4CH (A/L)     82 ml         -                        

LA ESV SP 2CH (A/L)     60 ml         -                        

LA ESV BP (A/L)         82 ml         -                        

LA ESV BP (A/L) index   40 ml/m2      -                        

LA ESV SP 4CH (MOD)     80 ml         -                        

LA ESV SP 2CH (MOD)     57 ml         -                        

 

Name                    Value         Normal Range            

MV E-wave Vmax          0.71 m/sec    -                        

MV deceleration time    248.3 msec    -                        

MV A-wave Vmax          0.82 m/sec    -                        

MV E:A ratio            0.86 ratio    -                        

LV septal e' Vmax       0.06 m/sec    -                        

LV lateral e' Vmax      0.08 m/sec    -                        

LV E:e' septal ratio    11.83 ratio   -                        

LV E:e' lateral ratio   8.88 ratio    -                        

 

Name                    Value         Normal Range            

AV Vmax                 1.5 m/sec     -                        

AV VTI                  24.3 cm       -                        

AV peak gradient        8.65 mmHg     -                        

AV mean gradient        3.25 mmHg     -                        

LVOT Vmax               1.13 m/sec    -                        

LVOT VTI                20.14 cm      -                        

LVOT peak gradient      5.15 mmHg     -                        

LVOT mean gradient      3.04 mmHg     -                        

KEANU Vmax                0.78 m/sec    -                        

 

Name                    Value         Normal Range            

TR Vmax                 3.2 m/sec     -                        

TR peak gradient        41 mmHg       -                        

RAP                     8 mmHg        -                        

RVSP                    49 mmHg       -                        

IVC diameter            2.2 cm        -                        

 

Name                    Value         Normal Range            

PV Vmax                 0.95 m/sec    -                        

PV peak gradient        3.66 mmHg     -                        

 

Electronically signed by: Perez Cruz DO on 2018 14:30:13

## 2018-07-22 NOTE — PN
Subjective


Date of Service: 07/22/18


Interval History: 





Pt feels somewhat SOB today. Left shoulder and the fractured clavicle bothers 

her despite wearing a sling





Objective


Active Medications: 








Acetaminophen (Tylenol Tab*)  650 mg PO Q4H PRN


   PRN Reason: FEVER/PAIN


Albuterol (Ventolin 2.5 Mg/3 Ml Neb.Sol*)  2.5 mg INH Q2H PRN


   PRN Reason: SOB/WHEEZING


Atorvastatin Calcium (Lipitor*)  20 mg PO BEDTIME [facility]; Protocol


   Last Admin: 07/21/18 20:28 Dose:  20 mg


Calcium Carbonate (Tums*)  1,000 mg PO Q4H PRN


   PRN Reason: HEARTBURN


   Last Admin: 07/21/18 20:28 Dose:  500 mg


Cholecalciferol (Vitamin D Tab*)  4,000 units PO DAILY [facility]


   Last Admin: 07/22/18 08:03 Dose:  4,000 units


Diphenhydramine HCl (Benadryl Po*)  25 mg PO Q6H PRN


   PRN Reason: ITCHING


Docusate Sodium (Colace Cap*)  100 mg PO BID [facility]


   Last Admin: 07/22/18 08:04 Dose:  100 mg


Enoxaparin Sodium (Lovenox(*))  40 mg SUBCUT DAILY [facility]


   Last Admin: 07/22/18 08:06 Dose:  40 mg


Magnesium Hydroxide (Milk Of Magnesia Liq*)  30 ml PO BID [facility]


   Last Admin: 07/22/18 08:08 Dose:  Not Given


Magnesium Hydroxide (Milk Of Magnesia Liq*)  30 ml PO BID PRN


   PRN Reason: CONSTIPATION


Metoprolol Tartrate (Lopressor Tab*)  12.5 mg PO Q12HR [facility]


   Last Admin: 07/22/18 08:05 Dose:  12.5 mg


Morphine Sulfate (Morphine Vial*)  2 mg IV Q4H PRN


   PRN Reason: PAIN - BREAKTHROUGH (SEVERE)


   Last Admin: 07/20/18 12:34 Dose:  2 mg


Omeprazole (Prilosec Cap*)  20 mg PO DAILY [facility]


   Last Admin: 07/22/18 08:06 Dose:  20 mg


Ondansetron HCl (Zofran Inj*)  4 mg IV Q6H PRN


   PRN Reason: NAUSEA


Oxycodone/Acetaminophen (Percocet 5/325 Tab*)  1 tab PO Q4H PRN


   PRN Reason: PAIN - MODERATE


   Last Admin: 07/21/18 08:44 Dose:  1 tab


Oxycodone/Acetaminophen (Percocet 5/325 Tab*)  2 tab PO Q4H PRN


   PRN Reason: PAIN - SEVERE


   Last Admin: 07/20/18 05:29 Dose:  2 tab


Senna (Senokot Tab*)  1 tab PO BEDTIME PRN


   PRN Reason: CONSTIPATION


Tramadol HCl (Ultram*)  50 mg PO Q8H PRN


   PRN Reason: PAIN


   Last Admin: 07/22/18 08:04 Dose:  50 mg








 Vital Signs - 8 hr











  07/22/18 07/22/18 07/22/18





  07:54 08:00 08:04


 


Temperature 98.5 F  


 


Pulse Rate 106  


 


Respiratory 20 18 18





Rate   


 


Blood Pressure 148/60  





(mmHg)   


 


O2 Sat by Pulse 98 98 





Oximetry   














  07/22/18





  13:11


 


Temperature 


 


Pulse Rate 


 


Respiratory 20





Rate 


 


Blood Pressure 





(mmHg) 


 


O2 Sat by Pulse 





Oximetry 











Oxygen Devices in Use Now: Nasal Cannula


Appearance: 74 yo f in NAD, aAOx3


Eyes: No Scleral Icterus, PERRLA


Ears/Nose/Mouth/Throat: NL Teeth, Lips, Gums, Mucous Membranes Moist


Neck: NL Appearance and Movements; NL JVP, Trachea Midline


Respiratory: Symmetrical Chest Expansion and Respiratory Effort, - - crackles 

at b/l bases


Cardiovascular: NL Sounds; No Murmurs; No JVD, RRR, - - tachy


Abdominal: NL Sounds; No Tenderness; No Distention, No Hepatosplenomegaly


Lymphatic: No Cervical Adenopathy


Extremities: No Clubbing, Cyanosis, - - +1 b/l pitting pedal edema R>L


Skin: No Nodules or Sclerosis, - - L hip incision stapled, no dehiscence


Neurological: Alert and Oriented x 3, NL Muscle Strength and Tone


Result Diagrams: 


 07/22/18 06:01





 07/22/18 06:01


Additional Lab and Data: 


 Lab Results











  07/19/18 07/19/18 07/19/18 Range/Units





  09:48 09:54 09:54 


 


WBC   6.5   (3.5-10.8)  10^3/ul


 


RBC   4.95   (4.00-5.40)  10^6/ul


 


Hgb   14.1   (12.0-16.0)  g/dl


 


Hct   42   (35-47)  %


 


MCV   85   (80-97)  fL


 


MCH   29   (27-31)  pg


 


MCHC   34   (31-36)  g/dl


 


RDW   14   (10.5-15)  %


 


Plt Count   193   (150-450)  10^3/ul


 


MPV   7.8   (7.4-10.4)  um3


 


Neut % (Auto)   69.9   (38-83)  %


 


Lymph % (Auto)   13.8 L   (25-47)  %


 


Mono % (Auto)   14.2 H   (0-7)  %


 


Eos % (Auto)   1.4   (0-6)  %


 


Baso % (Auto)   0.7   (0-2)  %


 


Absolute Neuts (auto)   4.5   (1.5-7.7)  10^3/ul


 


Absolute Lymphs (auto)   0.9 L   (1.0-4.8)  10^3/ul


 


Absolute Monos (auto)   0.9 H   (0-0.8)  10^3/ul


 


Absolute Eos (auto)   0.1   (0-0.6)  10^3/ul


 


Absolute Basos (auto)   0   (0-0.2)  10^3/ul


 


Absolute Nucleated RBC   0   10^3/ul


 


Nucleated RBC %   0.3   


 


INR (Anticoag Therapy)    1.02  (0.77-1.02)  


 


APTT    45.1 H  (26.0-36.3)  seconds


 


Sodium     (135-145)  mmol/L


 


Potassium     (3.5-5.0)  mmol/L


 


Chloride     (101-111)  mmol/L


 


Carbon Dioxide     (22-32)  mmol/L


 


Anion Gap     (2-11)  mmol/L


 


BUN     (6-24)  mg/dL


 


Creatinine     (0.51-0.95)  mg/dL


 


Est GFR ( Amer)     (>60)  


 


Est GFR (Non-Af Amer)     (>60)  


 


BUN/Creatinine Ratio     (8-20)  


 


Glucose     ()  mg/dL


 


Lactic Acid     (0.5-2.0)  mmol/L


 


Calcium     (8.6-10.3)  mg/dL


 


Total Bilirubin     (0.2-1.0)  mg/dL


 


AST     (13-39)  U/L


 


ALT     (7-52)  U/L


 


Alkaline Phosphatase     ()  U/L


 


Troponin I     (<0.04)  ng/mL


 


Total Protein     (6.4-8.9)  g/dL


 


Albumin     (3.2-5.2)  g/dL


 


Globulin     (2-4)  g/dL


 


Albumin/Globulin Ratio     (1-3)  


 


Urine Color  Straw    


 


Urine Appearance  Clear    


 


Urine pH  7.0    (5-9)  


 


Ur Specific Gravity  1.003 L    (1.010-1.030)  


 


Urine Protein  Negative    (Negative)  


 


Urine Ketones  1+ A    (Negative)  


 


Urine Blood  Negative    (Negative)  


 


Urine Nitrate  Negative    (Negative)  


 


Urine Bilirubin  Negative    (Negative)  


 


Urine Urobilinogen  Negative    (Negative)  


 


Ur Leukocyte Esterase  Negative    (Negative)  


 


Urine Glucose  Negative    (Negative)  














  07/19/18 07/19/18 Range/Units





  09:54 09:54 


 


WBC    (3.5-10.8)  10^3/ul


 


RBC    (4.00-5.40)  10^6/ul


 


Hgb    (12.0-16.0)  g/dl


 


Hct    (35-47)  %


 


MCV    (80-97)  fL


 


MCH    (27-31)  pg


 


MCHC    (31-36)  g/dl


 


RDW    (10.5-15)  %


 


Plt Count    (150-450)  10^3/ul


 


MPV    (7.4-10.4)  um3


 


Neut % (Auto)    (38-83)  %


 


Lymph % (Auto)    (25-47)  %


 


Mono % (Auto)    (0-7)  %


 


Eos % (Auto)    (0-6)  %


 


Baso % (Auto)    (0-2)  %


 


Absolute Neuts (auto)    (1.5-7.7)  10^3/ul


 


Absolute Lymphs (auto)    (1.0-4.8)  10^3/ul


 


Absolute Monos (auto)    (0-0.8)  10^3/ul


 


Absolute Eos (auto)    (0-0.6)  10^3/ul


 


Absolute Basos (auto)    (0-0.2)  10^3/ul


 


Absolute Nucleated RBC    10^3/ul


 


Nucleated RBC %    


 


INR (Anticoag Therapy)    (0.77-1.02)  


 


APTT    (26.0-36.3)  seconds


 


Sodium  141   (135-145)  mmol/L


 


Potassium  3.6   (3.5-5.0)  mmol/L


 


Chloride  102   (101-111)  mmol/L


 


Carbon Dioxide  29   (22-32)  mmol/L


 


Anion Gap  10   (2-11)  mmol/L


 


BUN  6   (6-24)  mg/dL


 


Creatinine  0.63   (0.51-0.95)  mg/dL


 


Est GFR ( Amer)  111.8   (>60)  


 


Est GFR (Non-Af Amer)  92.4   (>60)  


 


BUN/Creatinine Ratio  9.5   (8-20)  


 


Glucose  107 H   ()  mg/dL


 


Lactic Acid   1.0  (0.5-2.0)  mmol/L


 


Calcium  9.2   (8.6-10.3)  mg/dL


 


Total Bilirubin  1.10 H   (0.2-1.0)  mg/dL


 


AST  15   (13-39)  U/L


 


ALT  11   (7-52)  U/L


 


Alkaline Phosphatase  49   ()  U/L


 


Troponin I  0.00   (<0.04)  ng/mL


 


Total Protein  6.5   (6.4-8.9)  g/dL


 


Albumin  3.9   (3.2-5.2)  g/dL


 


Globulin  2.6   (2-4)  g/dL


 


Albumin/Globulin Ratio  1.5   (1-3)  


 


Urine Color    


 


Urine Appearance    


 


Urine pH    (5-9)  


 


Ur Specific Gravity    (1.010-1.030)  


 


Urine Protein    (Negative)  


 


Urine Ketones    (Negative)  


 


Urine Blood    (Negative)  


 


Urine Nitrate    (Negative)  


 


Urine Bilirubin    (Negative)  


 


Urine Urobilinogen    (Negative)  


 


Ur Leukocyte Esterase    (Negative)  


 


Urine Glucose    (Negative)  











Microbiology and Other Data: 


 Microbiology











 07/20/18 23:53 Nasal Screen MRSA (PCR) - Final





 Nasal    Mrsa Not Detected














Assess/Plan/Problems-Billing


Assessment: 74 yo F with h/o tachycardia presents with mechanical left hip fx 

and left clavicle fx











- Patient Problems


(1) Closed left hip fracture


Comment: s/p left hip hemiarthroplasty performed by  on 7/20/18   





(2) Closed left clavicular fracture


Comment: d/w DR. Thapa, OK to use sling for comfort   





(3) Tachycardia


Comment: in sinus tachy


evaluated by cardiology  in the past as per pt.


Developed short period of A. fib with RVR post op, converted to sinus tacchy 

after BB. In face of hypokalemia and hypomagnesemia. Started on low dose 

lopressor


Echo  shows EF 65%, mod pulm HTN   





(4) DVT prophylaxis


Comment: lovenox   


Status and Disposition: 


inpatient

## 2018-07-23 LAB
BASOPHILS # BLD AUTO: 0 10^3/UL (ref 0–0.2)
EOSINOPHIL # BLD AUTO: 0.1 10^3/UL (ref 0–0.6)
HCT VFR BLD AUTO: 32 % (ref 35–47)
HGB BLD-MCNC: 11 G/DL (ref 12–16)
LYMPHOCYTES # BLD AUTO: 0.6 10^3/UL (ref 1–4.8)
MCH RBC QN AUTO: 29 PG (ref 27–31)
MCHC RBC AUTO-ENTMCNC: 35 G/DL (ref 31–36)
MCV RBC AUTO: 84 FL (ref 80–97)
MONOCYTES # BLD AUTO: 1.3 10^3/UL (ref 0–0.8)
NEUTROPHILS # BLD AUTO: 9.2 10^3/UL (ref 1.5–7.7)
NRBC # BLD AUTO: 0 10^3/UL
NRBC BLD QL AUTO: 0
PLATELET # BLD AUTO: 228 10^3/UL (ref 150–450)
RBC # BLD AUTO: 3.77 10^6/UL (ref 4–5.4)
WBC # BLD AUTO: 11.2 10^3/UL (ref 3.5–10.8)

## 2018-07-23 RX ADMIN — OMEPRAZOLE SCH MG: 20 CAPSULE, DELAYED RELEASE ORAL at 08:18

## 2018-07-23 RX ADMIN — SOTALOL HYDROCHLORIDE SCH MG: 80 TABLET ORAL at 21:35

## 2018-07-23 RX ADMIN — ATORVASTATIN CALCIUM SCH MG: 20 TABLET, FILM COATED ORAL at 21:35

## 2018-07-23 RX ADMIN — DILTIAZEM HYDROCHLORIDE SCH MG: 30 TABLET, FILM COATED ORAL at 18:04

## 2018-07-23 RX ADMIN — ENOXAPARIN SODIUM SCH MG: 40 INJECTION SUBCUTANEOUS at 08:19

## 2018-07-23 RX ADMIN — DILTIAZEM HYDROCHLORIDE SCH MG: 30 TABLET, FILM COATED ORAL at 12:38

## 2018-07-23 RX ADMIN — DILTIAZEM HYDROCHLORIDE SCH MG: 30 TABLET, FILM COATED ORAL at 01:12

## 2018-07-23 RX ADMIN — DOCUSATE SODIUM SCH MG: 100 CAPSULE, LIQUID FILLED ORAL at 08:18

## 2018-07-23 RX ADMIN — DILTIAZEM HYDROCHLORIDE SCH MG: 30 TABLET, FILM COATED ORAL at 06:24

## 2018-07-23 RX ADMIN — SOTALOL HYDROCHLORIDE SCH MG: 80 TABLET ORAL at 08:19

## 2018-07-23 RX ADMIN — ACETAMINOPHEN PRN MG: 325 TABLET ORAL at 08:18

## 2018-07-23 RX ADMIN — DILTIAZEM HYDROCHLORIDE SCH MG: 30 TABLET, FILM COATED ORAL at 23:47

## 2018-07-23 RX ADMIN — DOCUSATE SODIUM SCH MG: 100 CAPSULE, LIQUID FILLED ORAL at 21:35

## 2018-07-23 RX ADMIN — Medication SCH UNITS: at 08:18

## 2018-07-23 NOTE — RAD
INDICATION:  Left ankle injury.



TECHNIQUE: 3 views of the left ankle were obtained.



FINDINGS:  There is diffuse soft tissue swelling. The bones are in normal alignment. No

fracture is seen. Joint spaces appear maintained.



IMPRESSION:  SOFT TISSUE SWELLING, NO FRACTURE IS SEEN.

## 2018-07-23 NOTE — PN
Subjective


Date of Service: 07/23/18


Interval History: 





Pt got confused last night. Thought that she was in a movie, pulled her wound 

dressings off and started pulling off her telemetry leads. today feels 

embarrassed about it. No CP or SOB. Back in NSR


C/o left ankle pain/weakness





Objective


Active Medications: 








Acetaminophen (Tylenol Tab*)  650 mg PO Q4H PRN


   PRN Reason: FEVER/PAIN


   Last Admin: 07/23/18 08:18 Dose:  650 mg


Albuterol (Ventolin 2.5 Mg/3 Ml Neb.Sol*)  2.5 mg INH Q2H PRN


   PRN Reason: SOB/WHEEZING


Atorvastatin Calcium (Lipitor*)  20 mg PO BEDTIME [facility]; Protocol


   Last Admin: 07/22/18 20:26 Dose:  20 mg


Calcium Carbonate (Tums*)  1,000 mg PO Q4H PRN


   PRN Reason: HEARTBURN


   Last Admin: 07/21/18 20:28 Dose:  500 mg


Cholecalciferol (Vitamin D Tab*)  4,000 units PO DAILY [facility]


   Last Admin: 07/23/18 08:18 Dose:  4,000 units


Diltiazem HCl (Cardizem Tab*)  30 mg PO Q6HR [facility]


   Last Admin: 07/23/18 06:24 Dose:  30 mg


Diphenhydramine HCl (Benadryl Po*)  25 mg PO Q6H PRN


   PRN Reason: ITCHING


Docusate Sodium (Colace Cap*)  100 mg PO BID [facility]


   Last Admin: 07/23/18 08:18 Dose:  100 mg


Enoxaparin Sodium (Lovenox(*))  40 mg SUBCUT DAILY [facility]


   Last Admin: 07/23/18 08:19 Dose:  40 mg


Magnesium Hydroxide (Milk Of Magnesia Liq*)  30 ml PO BID PRN


   PRN Reason: CONSTIPATION


Morphine Sulfate (Morphine Vial*)  2 mg IV Q4H PRN


   PRN Reason: PAIN - BREAKTHROUGH (SEVERE)


   Last Admin: 07/20/18 12:34 Dose:  2 mg


Omeprazole (Prilosec Cap*)  20 mg PO DAILY [facility]


   Last Admin: 07/23/18 08:18 Dose:  20 mg


Ondansetron HCl (Zofran Inj*)  4 mg IV Q6H PRN


   PRN Reason: NAUSEA


Oxycodone/Acetaminophen (Percocet 5/325 Tab*)  1 tab PO Q4H PRN


   PRN Reason: PAIN - MODERATE


   Last Admin: 07/21/18 08:44 Dose:  1 tab


Oxycodone/Acetaminophen (Percocet 5/325 Tab*)  2 tab PO Q4H PRN


   PRN Reason: PAIN - SEVERE


   Last Admin: 07/20/18 05:29 Dose:  2 tab


Senna (Senokot Tab*)  1 tab PO BEDTIME PRN


   PRN Reason: CONSTIPATION


Sotalol HCl (Betapace Tab*)  80 mg PO BID [facility]


   Last Admin: 07/23/18 08:19 Dose:  80 mg


Tramadol HCl (Ultram*)  50 mg PO Q8H PRN


   PRN Reason: PAIN


   Last Admin: 07/22/18 08:04 Dose:  50 mg








 Vital Signs - 8 hr











  07/23/18 07/23/18 07/23/18





  00:57 04:07 07:50


 


Temperature 98.2 F 97.8 F 98.5 F


 


Pulse Rate 91 89 86


 


Respiratory 20 18 20





Rate   


 


Blood Pressure 136/57 130/57 139/64





(mmHg)   


 


O2 Sat by Pulse 96 100 99





Oximetry   














  07/23/18





  08:00


 


Temperature 


 


Pulse Rate 


 


Respiratory 20





Rate 


 


Blood Pressure 





(mmHg) 


 


O2 Sat by Pulse 





Oximetry 











Oxygen Devices in Use Now: Nasal Cannula


Appearance: 72 yo F in nAD, aAOx3


Eyes: No Scleral Icterus, PERRLA


Ears/Nose/Mouth/Throat: NL Teeth, Lips, Gums, Mucous Membranes Moist


Neck: NL Appearance and Movements; NL JVP, Trachea Midline, No Thyroid 

Enlargement, Masses


Respiratory: Symmetrical Chest Expansion and Respiratory Effort, Clear to 

Auscultation


Cardiovascular: NL Sounds; No Murmurs; No JVD, RRR


Abdominal: NL Sounds; No Tenderness; No Distention


Lymphatic: No Cervical Adenopathy


Extremities: No Edema, No Clubbing, Cyanosis, - - left ankle examined, no 

tenderness, good ROM, no edema 


Skin: No Nodules or Sclerosis, - - left hip incision stapled, no dehiscence 

noted, left shoulder ecchymosis-resolving


Neurological: Alert and Oriented x 3, NL Muscle Strength and Tone


Result Diagrams: 


 07/23/18 06:25





 07/23/18 06:25


Additional Lab and Data: 


 Lab Results











  07/19/18 07/19/18 07/19/18 Range/Units





  09:48 09:54 09:54 


 


WBC   6.5   (3.5-10.8)  10^3/ul


 


RBC   4.95   (4.00-5.40)  10^6/ul


 


Hgb   14.1   (12.0-16.0)  g/dl


 


Hct   42   (35-47)  %


 


MCV   85   (80-97)  fL


 


MCH   29   (27-31)  pg


 


MCHC   34   (31-36)  g/dl


 


RDW   14   (10.5-15)  %


 


Plt Count   193   (150-450)  10^3/ul


 


MPV   7.8   (7.4-10.4)  um3


 


Neut % (Auto)   69.9   (38-83)  %


 


Lymph % (Auto)   13.8 L   (25-47)  %


 


Mono % (Auto)   14.2 H   (0-7)  %


 


Eos % (Auto)   1.4   (0-6)  %


 


Baso % (Auto)   0.7   (0-2)  %


 


Absolute Neuts (auto)   4.5   (1.5-7.7)  10^3/ul


 


Absolute Lymphs (auto)   0.9 L   (1.0-4.8)  10^3/ul


 


Absolute Monos (auto)   0.9 H   (0-0.8)  10^3/ul


 


Absolute Eos (auto)   0.1   (0-0.6)  10^3/ul


 


Absolute Basos (auto)   0   (0-0.2)  10^3/ul


 


Absolute Nucleated RBC   0   10^3/ul


 


Nucleated RBC %   0.3   


 


INR (Anticoag Therapy)    1.02  (0.77-1.02)  


 


APTT    45.1 H  (26.0-36.3)  seconds


 


Sodium     (135-145)  mmol/L


 


Potassium     (3.5-5.0)  mmol/L


 


Chloride     (101-111)  mmol/L


 


Carbon Dioxide     (22-32)  mmol/L


 


Anion Gap     (2-11)  mmol/L


 


BUN     (6-24)  mg/dL


 


Creatinine     (0.51-0.95)  mg/dL


 


Est GFR ( Amer)     (>60)  


 


Est GFR (Non-Af Amer)     (>60)  


 


BUN/Creatinine Ratio     (8-20)  


 


Glucose     ()  mg/dL


 


Lactic Acid     (0.5-2.0)  mmol/L


 


Calcium     (8.6-10.3)  mg/dL


 


Total Bilirubin     (0.2-1.0)  mg/dL


 


AST     (13-39)  U/L


 


ALT     (7-52)  U/L


 


Alkaline Phosphatase     ()  U/L


 


Troponin I     (<0.04)  ng/mL


 


Total Protein     (6.4-8.9)  g/dL


 


Albumin     (3.2-5.2)  g/dL


 


Globulin     (2-4)  g/dL


 


Albumin/Globulin Ratio     (1-3)  


 


Urine Color  Straw    


 


Urine Appearance  Clear    


 


Urine pH  7.0    (5-9)  


 


Ur Specific Gravity  1.003 L    (1.010-1.030)  


 


Urine Protein  Negative    (Negative)  


 


Urine Ketones  1+ A    (Negative)  


 


Urine Blood  Negative    (Negative)  


 


Urine Nitrate  Negative    (Negative)  


 


Urine Bilirubin  Negative    (Negative)  


 


Urine Urobilinogen  Negative    (Negative)  


 


Ur Leukocyte Esterase  Negative    (Negative)  


 


Urine Glucose  Negative    (Negative)  














  07/19/18 07/19/18 Range/Units





  09:54 09:54 


 


WBC    (3.5-10.8)  10^3/ul


 


RBC    (4.00-5.40)  10^6/ul


 


Hgb    (12.0-16.0)  g/dl


 


Hct    (35-47)  %


 


MCV    (80-97)  fL


 


MCH    (27-31)  pg


 


MCHC    (31-36)  g/dl


 


RDW    (10.5-15)  %


 


Plt Count    (150-450)  10^3/ul


 


MPV    (7.4-10.4)  um3


 


Neut % (Auto)    (38-83)  %


 


Lymph % (Auto)    (25-47)  %


 


Mono % (Auto)    (0-7)  %


 


Eos % (Auto)    (0-6)  %


 


Baso % (Auto)    (0-2)  %


 


Absolute Neuts (auto)    (1.5-7.7)  10^3/ul


 


Absolute Lymphs (auto)    (1.0-4.8)  10^3/ul


 


Absolute Monos (auto)    (0-0.8)  10^3/ul


 


Absolute Eos (auto)    (0-0.6)  10^3/ul


 


Absolute Basos (auto)    (0-0.2)  10^3/ul


 


Absolute Nucleated RBC    10^3/ul


 


Nucleated RBC %    


 


INR (Anticoag Therapy)    (0.77-1.02)  


 


APTT    (26.0-36.3)  seconds


 


Sodium  141   (135-145)  mmol/L


 


Potassium  3.6   (3.5-5.0)  mmol/L


 


Chloride  102   (101-111)  mmol/L


 


Carbon Dioxide  29   (22-32)  mmol/L


 


Anion Gap  10   (2-11)  mmol/L


 


BUN  6   (6-24)  mg/dL


 


Creatinine  0.63   (0.51-0.95)  mg/dL


 


Est GFR ( Amer)  111.8   (>60)  


 


Est GFR (Non-Af Amer)  92.4   (>60)  


 


BUN/Creatinine Ratio  9.5   (8-20)  


 


Glucose  107 H   ()  mg/dL


 


Lactic Acid   1.0  (0.5-2.0)  mmol/L


 


Calcium  9.2   (8.6-10.3)  mg/dL


 


Total Bilirubin  1.10 H   (0.2-1.0)  mg/dL


 


AST  15   (13-39)  U/L


 


ALT  11   (7-52)  U/L


 


Alkaline Phosphatase  49   ()  U/L


 


Troponin I  0.00   (<0.04)  ng/mL


 


Total Protein  6.5   (6.4-8.9)  g/dL


 


Albumin  3.9   (3.2-5.2)  g/dL


 


Globulin  2.6   (2-4)  g/dL


 


Albumin/Globulin Ratio  1.5   (1-3)  


 


Urine Color    


 


Urine Appearance    


 


Urine pH    (5-9)  


 


Ur Specific Gravity    (1.010-1.030)  


 


Urine Protein    (Negative)  


 


Urine Ketones    (Negative)  


 


Urine Blood    (Negative)  


 


Urine Nitrate    (Negative)  


 


Urine Bilirubin    (Negative)  


 


Urine Urobilinogen    (Negative)  


 


Ur Leukocyte Esterase    (Negative)  


 


Urine Glucose    (Negative)  











Microbiology and Other Data: 


 Microbiology











 07/20/18 23:53 Nasal Screen MRSA (PCR) - Final





 Nasal    Mrsa Not Detected














Assess/Plan/Problems-Billing


Assessment: 72 yo F with h/o tachycardia presents with mechanical left hip fx 

and left clavicle fx











- Patient Problems


(1) Closed left hip fracture


Comment: s/p left hip hemiarthroplasty performed by  on 7/20/18


Pt c/o left aknle pain-will defer to ortho, XRay ordered   





(2) Closed left clavicular fracture


Comment: d/w DR. Thapa, OK to use sling for comfort   





(3) Paroxysmal atrial fibrillation with rapid ventricular response


Comment: 2 episoded so far,one on the day of surgery and another one on 7/22/18


appreciate cardilogy consult. Sotalol started on 7/22/18. con Cardizem.


Spoke with Dr. Stiles about anticoagulation:p tcould be started today if 

needed, but  due to the a. fib lasting just hours, will hold off full 

anticoagulation for now due to post op state.


Pt and daughter aware that pt will need to be placed on full anticoagulation 

for cardiembolic CVA prevention in  the future.   





(4) DVT prophylaxis


Comment: lovenox   


Status and Disposition: 


inpatient

## 2018-07-23 NOTE — PN
Progress Note





- Progress Note


Date of Service: 07/23/18


SOAP: 


Subjective:


[]Patient seen at bedside.  L hip pain is quite tolerable. Her left clavical is 

more painful, but again tolerable. She is on telemetry due to paroxysmal a fib, 

she also had an episode of confusion overnight in which she thought she was in 

a movie and removed her surgical dressing. Her confusion has improved this 

morning. Denies CP, SOB, dizziness.





Objective:


[]General: Well appearing, NAD. Appropriate conversation.


LLE: Dressing changed. Incision CDI without erythema or discharge. Thigh is 

soft. Able to sit to stand/ stand to sit with assistance. DF/PF intact. DP2+. 

Sensation intact distally. 


Bilateral calves are supple and nontender without erythema, edema or palpable 

cords.


LUE: Ecchymosis over clavicle. Moves elbow, wrist, fingers well. 





Assessment:


[]POD 3 sp left hip hemiarthroplasty


Left clavical fracture.





Plan:


WBAT LLE walker assist, anterior hip precautions. 


NWB LUE (clavicle fx)


Lovenox 40 mg sq daily x 30 days


Daily dressing change


Follow up with Dr Stiles outpt 10-14 days post op, sooner with concerns


 Vital Signs











Temp  97.9 F   07/23/18 11:57


 


Pulse  76   07/23/18 11:57


 


Resp  16   07/23/18 11:57


 


BP  113/54   07/23/18 11:57


 


Pulse Ox  99   07/23/18 11:57








 Intake & Output











 07/22/18 07/23/18 07/23/18





 18:59 06:59 18:59


 


Intake Total 840 0 320


 


Output Total 850 3200 


 


Balance -10 -3200 320


 


Intake:   


 


  Oral 840 0 320


 


  Quintero Irrigate Amount  0 


 


Output:   


 


  Urine 850  


 


  Quintero  3200 








 Laboratory Last Values











WBC  11.2 10^3/ul (3.5-10.8)  H  07/23/18  06:25    


 


RBC  3.77 10^6/ul (4.00-5.40)  L  07/23/18  06:25    


 


Hgb  11.0 g/dl (12.0-16.0)  L  07/23/18  06:25    


 


Hct  32 % (35-47)  L  07/23/18  06:25    


 


MCV  84 fL (80-97)   07/23/18  06:25    


 


MCH  29 pg (27-31)   07/23/18  06:25    


 


MCHC  35 g/dl (31-36)   07/23/18  06:25    


 


RDW  14 % (10.5-15)   07/23/18  06:25    


 


Plt Count  228 10^3/ul (150-450)   07/23/18  06:25    


 


MPV  7.8 um3 (7.4-10.4)   07/23/18  06:25    


 


Neut % (Auto)  82.2 % (38-83)   07/23/18  06:25    


 


Lymph % (Auto)  5.2 % (25-47)  L  07/23/18  06:25    


 


Mono % (Auto)  11.7 % (0-7)  H  07/23/18  06:25    


 


Eos % (Auto)  0.6 % (0-6)   07/23/18  06:25    


 


Baso % (Auto)  0.3 % (0-2)   07/23/18  06:25    


 


Absolute Neuts (auto)  9.2 10^3/ul (1.5-7.7)  H  07/23/18  06:25    


 


Absolute Lymphs (auto)  0.6 10^3/ul (1.0-4.8)  L  07/23/18  06:25    


 


Absolute Monos (auto)  1.3 10^3/ul (0-0.8)  H  07/23/18  06:25    


 


Absolute Eos (auto)  0.1 10^3/ul (0-0.6)   07/23/18  06:25    


 


Absolute Basos (auto)  0 10^3/ul (0-0.2)   07/23/18  06:25    


 


Absolute Nucleated RBC  0 10^3/ul  07/23/18  06:25    


 


Nucleated RBC %  0   07/23/18  06:25    


 


INR (Anticoag Therapy)  1.02  (0.77-1.02)   07/20/18  05:11    


 


APTT  45.1 seconds (26.0-36.3)  H  07/19/18  09:54    


 


Sodium  137 mmol/L (135-145)   07/23/18  06:25    


 


Potassium  3.6 mmol/L (3.5-5.0)   07/23/18  06:25    


 


Chloride  98 mmol/L (101-111)  L  07/23/18  06:25    


 


Carbon Dioxide  32 mmol/L (22-32)   07/23/18  06:25    


 


Anion Gap  7 mmol/L (2-11)   07/23/18  06:25    


 


BUN  9 mg/dL (6-24)   07/23/18  06:25    


 


Creatinine  0.53 mg/dL (0.51-0.95)   07/23/18  06:25    


 


Est GFR ( Amer)  136.4  (>60)   07/23/18  06:25    


 


Est GFR (Non-Af Amer)  112.8  (>60)   07/23/18  06:25    


 


BUN/Creatinine Ratio  17.0  (8-20)   07/23/18  06:25    


 


Glucose  103 mg/dL ()  H  07/23/18  06:25    


 


Lactic Acid  1.0 mmol/L (0.5-2.0)   07/19/18  09:54    


 


Calcium  8.3 mg/dL (8.6-10.3)  L  07/23/18  06:25    


 


Magnesium  2.0 mg/dL (1.9-2.7)   07/23/18  06:25    


 


Total Bilirubin  1.10 mg/dL (0.2-1.0)  H  07/19/18  09:54    


 


AST  15 U/L (13-39)   07/19/18  09:54    


 


ALT  11 U/L (7-52)   07/19/18  09:54    


 


Alkaline Phosphatase  49 U/L ()   07/19/18  09:54    


 


Troponin I  0.01 ng/mL (<0.04)   07/21/18  05:00    


 


Total Protein  6.5 g/dL (6.4-8.9)   07/19/18  09:54    


 


Albumin  3.9 g/dL (3.2-5.2)   07/19/18  09:54    


 


Globulin  2.6 g/dL (2-4)   07/19/18  09:54    


 


Albumin/Globulin Ratio  1.5  (1-3)   07/19/18  09:54    


 


Vitamin B12  267 pg/mL (180-914)   07/19/18  09:54    


 


TSH  3.68 mcIU/mL (0.34-5.60)   07/19/18  09:54    


 


Urine Color  Straw   07/19/18  09:48    


 


Urine Appearance  Clear   07/19/18  09:48    


 


Urine pH  7.0  (5-9)   07/19/18  09:48    


 


Ur Specific Gravity  1.003  (1.010-1.030)  L  07/19/18  09:48    


 


Urine Protein  Negative  (Negative)   07/19/18  09:48    


 


Urine Ketones  1+  (Negative)  A  07/19/18  09:48    


 


Urine Blood  Negative  (Negative)   07/19/18  09:48    


 


Urine Nitrate  Negative  (Negative)   07/19/18  09:48    


 


Urine Bilirubin  Negative  (Negative)   07/19/18  09:48    


 


Urine Urobilinogen  Negative  (Negative)   07/19/18  09:48    


 


Ur Leukocyte Esterase  Negative  (Negative)   07/19/18  09:48    


 


Urine Glucose  Negative  (Negative)   07/19/18  09:48

## 2018-07-24 LAB
HCT VFR BLD AUTO: 33 % (ref 35–47)
HGB BLD-MCNC: 10.9 G/DL (ref 12–16)
WBC # BLD AUTO: 9.1 10^3/UL (ref 3.5–10.8)

## 2018-07-24 RX ADMIN — ENOXAPARIN SODIUM SCH MG: 40 INJECTION SUBCUTANEOUS at 07:54

## 2018-07-24 RX ADMIN — DILTIAZEM HYDROCHLORIDE SCH MG: 30 TABLET, FILM COATED ORAL at 05:24

## 2018-07-24 RX ADMIN — DILTIAZEM HYDROCHLORIDE SCH MG: 30 TABLET, FILM COATED ORAL at 17:31

## 2018-07-24 RX ADMIN — SOTALOL HYDROCHLORIDE SCH MG: 80 TABLET ORAL at 21:12

## 2018-07-24 RX ADMIN — ATORVASTATIN CALCIUM SCH MG: 20 TABLET, FILM COATED ORAL at 21:12

## 2018-07-24 RX ADMIN — ACETAMINOPHEN PRN MG: 325 TABLET ORAL at 09:21

## 2018-07-24 RX ADMIN — DILTIAZEM HYDROCHLORIDE SCH MG: 30 TABLET, FILM COATED ORAL at 12:03

## 2018-07-24 RX ADMIN — Medication SCH UNITS: at 07:54

## 2018-07-24 RX ADMIN — SOTALOL HYDROCHLORIDE SCH MG: 80 TABLET ORAL at 07:54

## 2018-07-24 RX ADMIN — OMEPRAZOLE SCH MG: 20 CAPSULE, DELAYED RELEASE ORAL at 07:54

## 2018-07-24 RX ADMIN — DOCUSATE SODIUM SCH: 100 CAPSULE, LIQUID FILLED ORAL at 07:54

## 2018-07-24 RX ADMIN — DOCUSATE SODIUM SCH: 100 CAPSULE, LIQUID FILLED ORAL at 21:12

## 2018-07-24 NOTE — PN
Progress Note





- Progress Note


Date of Service: 07/24/18


SOAP: 


Subjective:


[]Patient seen OOB in chair. She is feeling well today without complaint of 

left hip or clavicle pain. Denies confusion, CP, SOB. Needs frequent reminders 

to avoid weight bearing on LUE. 





Objective:


[] General: Well appearing, NAD. Appropriate conversation.


LLE: Dressing changed. Incision CDI without erythema or discharge. Thigh is 

soft. Able to sit to stand/ stand to sit with assistance. DF/PF intact. DP2+. 

Sensation intact distally. 


Bilateral calves are supple and nontender without erythema, edema or palpable 

cords.


LUE: Ecchymosis over clavicle. Moving elbow, wrist, fingers well. 





Assessment:


[]POD 3 sp left hip hemiarthroplasty


Left clavicle fracture.





Plan:


WBAT LLE walker assist, anterior hip precautions. 


NWB LUE (clavicle fx)


Lovenox 40 mg sq daily x 30 days


Daily dressing change


Follow up with Dr Stiles outpt 10-14 days post op, sooner with concerns


 Vital Signs











Temp  98.5 F   07/24/18 07:40


 


Pulse  84   07/24/18 07:40


 


Resp  20   07/24/18 08:00


 


BP  128/64   07/24/18 07:40


 


Pulse Ox  96   07/24/18 07:40








 Intake & Output











 07/23/18 07/24/18 07/24/18





 18:59 06:59 18:59


 


Intake Total 1160 640 


 


Output Total 550 1075 


 


Balance 610 -435 


 


Intake:   


 


  Oral 1160 640 


 


Output:   


 


  Quintero 550 1075 


 


Other:   


 


  Estimated Void  Large 


 


  # Bowel Movements  1 1


 


  Estimated Stool Amount  Small Small


 


  # Voids  1 








 Laboratory Last Values











WBC  9.1 10^3/ul (3.5-10.8)   07/24/18  05:36    


 


RBC  3.77 10^6/ul (4.00-5.40)  L  07/23/18  06:25    


 


Hgb  10.9 g/dl (12.0-16.0)  L  07/24/18  05:36    


 


Hct  33 % (35-47)  L  07/24/18  05:36    


 


MCV  84 fL (80-97)   07/23/18  06:25    


 


MCH  29 pg (27-31)   07/23/18  06:25    


 


MCHC  35 g/dl (31-36)   07/23/18  06:25    


 


RDW  14 % (10.5-15)   07/23/18  06:25    


 


Plt Count  228 10^3/ul (150-450)   07/23/18  06:25    


 


MPV  7.8 um3 (7.4-10.4)   07/23/18  06:25    


 


Neut % (Auto)  82.2 % (38-83)   07/23/18  06:25    


 


Lymph % (Auto)  5.2 % (25-47)  L  07/23/18  06:25    


 


Mono % (Auto)  11.7 % (0-7)  H  07/23/18  06:25    


 


Eos % (Auto)  0.6 % (0-6)   07/23/18  06:25    


 


Baso % (Auto)  0.3 % (0-2)   07/23/18  06:25    


 


Absolute Neuts (auto)  9.2 10^3/ul (1.5-7.7)  H  07/23/18  06:25    


 


Absolute Lymphs (auto)  0.6 10^3/ul (1.0-4.8)  L  07/23/18  06:25    


 


Absolute Monos (auto)  1.3 10^3/ul (0-0.8)  H  07/23/18  06:25    


 


Absolute Eos (auto)  0.1 10^3/ul (0-0.6)   07/23/18  06:25    


 


Absolute Basos (auto)  0 10^3/ul (0-0.2)   07/23/18  06:25    


 


Absolute Nucleated RBC  0 10^3/ul  07/23/18  06:25    


 


Nucleated RBC %  0   07/23/18  06:25    


 


INR (Anticoag Therapy)  1.02  (0.77-1.02)   07/20/18  05:11    


 


APTT  45.1 seconds (26.0-36.3)  H  07/19/18  09:54    


 


Sodium  137 mmol/L (135-145)   07/23/18  06:25    


 


Potassium  3.6 mmol/L (3.5-5.0)   07/23/18  06:25    


 


Chloride  98 mmol/L (101-111)  L  07/23/18  06:25    


 


Carbon Dioxide  32 mmol/L (22-32)   07/23/18  06:25    


 


Anion Gap  7 mmol/L (2-11)   07/23/18  06:25    


 


BUN  9 mg/dL (6-24)   07/23/18  06:25    


 


Creatinine  0.53 mg/dL (0.51-0.95)   07/23/18  06:25    


 


Est GFR ( Amer)  136.4  (>60)   07/23/18  06:25    


 


Est GFR (Non-Af Amer)  112.8  (>60)   07/23/18  06:25    


 


BUN/Creatinine Ratio  17.0  (8-20)   07/23/18  06:25    


 


Glucose  103 mg/dL ()  H  07/23/18  06:25    


 


Lactic Acid  1.0 mmol/L (0.5-2.0)   07/19/18  09:54    


 


Calcium  8.3 mg/dL (8.6-10.3)  L  07/23/18  06:25    


 


Magnesium  2.0 mg/dL (1.9-2.7)   07/23/18  06:25    


 


Total Bilirubin  1.10 mg/dL (0.2-1.0)  H  07/19/18  09:54    


 


AST  15 U/L (13-39)   07/19/18  09:54    


 


ALT  11 U/L (7-52)   07/19/18  09:54    


 


Alkaline Phosphatase  49 U/L ()   07/19/18  09:54    


 


Troponin I  0.01 ng/mL (<0.04)   07/21/18  05:00    


 


Total Protein  6.5 g/dL (6.4-8.9)   07/19/18  09:54    


 


Albumin  3.9 g/dL (3.2-5.2)   07/19/18  09:54    


 


Globulin  2.6 g/dL (2-4)   07/19/18  09:54    


 


Albumin/Globulin Ratio  1.5  (1-3)   07/19/18  09:54    


 


Vitamin B12  267 pg/mL (180-914)   07/19/18  09:54    


 


TSH  3.68 mcIU/mL (0.34-5.60)   07/19/18  09:54    


 


Urine Color  Straw   07/19/18  09:48    


 


Urine Appearance  Clear   07/19/18  09:48    


 


Urine pH  7.0  (5-9)   07/19/18  09:48    


 


Ur Specific Gravity  1.003  (1.010-1.030)  L  07/19/18  09:48    


 


Urine Protein  Negative  (Negative)   07/19/18  09:48    


 


Urine Ketones  1+  (Negative)  A  07/19/18  09:48    


 


Urine Blood  Negative  (Negative)   07/19/18  09:48    


 


Urine Nitrate  Negative  (Negative)   07/19/18  09:48    


 


Urine Bilirubin  Negative  (Negative)   07/19/18  09:48    


 


Urine Urobilinogen  Negative  (Negative)   07/19/18  09:48    


 


Ur Leukocyte Esterase  Negative  (Negative)   07/19/18  09:48    


 


Urine Glucose  Negative  (Negative)   07/19/18  09:48

## 2018-07-24 NOTE — PN
Subjective


Date of Service: 07/24/18


Interval History: 





Pt feels well. planning to go to [facility] tomorrow. L hip post op pain controlled 

with pain meds





Objective


Active Medications: 








Acetaminophen (Tylenol Tab*)  650 mg PO Q4H PRN


   PRN Reason: FEVER/PAIN


   Last Admin: 07/24/18 09:21 Dose:  650 mg


Albuterol (Ventolin 2.5 Mg/3 Ml Neb.Sol*)  2.5 mg INH Q2H PRN


   PRN Reason: SOB/WHEEZING


Atorvastatin Calcium (Lipitor*)  20 mg PO BEDTIME [facility]; Protocol


   Last Admin: 07/23/18 21:35 Dose:  20 mg


Calcium Carbonate (Tums*)  1,000 mg PO Q4H PRN


   PRN Reason: HEARTBURN


   Last Admin: 07/21/18 20:28 Dose:  500 mg


Cholecalciferol (Vitamin D Tab*)  4,000 units PO DAILY [facility]


   Last Admin: 07/24/18 07:54 Dose:  4,000 units


Diltiazem HCl (Cardizem Tab*)  30 mg PO Q6HR [facility]


   Last Admin: 07/24/18 12:03 Dose:  30 mg


Diphenhydramine HCl (Benadryl Po*)  25 mg PO Q6H PRN


   PRN Reason: ITCHING


Docusate Sodium (Colace Cap*)  100 mg PO BID [facility]


   Last Admin: 07/24/18 07:54 Dose:  Not Given


Enoxaparin Sodium (Lovenox(*))  40 mg SUBCUT DAILY [facility]


   Last Admin: 07/24/18 07:54 Dose:  40 mg


Magnesium Hydroxide (Milk Of Magnesia Liq*)  30 ml PO BID PRN


   PRN Reason: CONSTIPATION


Morphine Sulfate (Morphine Vial*)  2 mg IV Q4H PRN


   PRN Reason: PAIN - BREAKTHROUGH (SEVERE)


   Last Admin: 07/20/18 12:34 Dose:  2 mg


Omeprazole (Prilosec Cap*)  20 mg PO DAILY [facility]


   Last Admin: 07/24/18 07:54 Dose:  20 mg


Ondansetron HCl (Zofran Inj*)  4 mg IV Q6H PRN


   PRN Reason: NAUSEA


Oxycodone/Acetaminophen (Percocet 5/325 Tab*)  1 tab PO Q4H PRN


   PRN Reason: PAIN - MODERATE


   Last Admin: 07/21/18 08:44 Dose:  1 tab


Oxycodone/Acetaminophen (Percocet 5/325 Tab*)  2 tab PO Q4H PRN


   PRN Reason: PAIN - SEVERE


   Last Admin: 07/20/18 05:29 Dose:  2 tab


Senna (Senokot Tab*)  1 tab PO BEDTIME PRN


   PRN Reason: CONSTIPATION


Sotalol HCl (Betapace Tab*)  80 mg PO BID [facility]


   Last Admin: 07/24/18 07:54 Dose:  80 mg


Tramadol HCl (Ultram*)  50 mg PO Q8H PRN


   PRN Reason: PAIN


   Last Admin: 07/22/18 08:04 Dose:  50 mg








 Vital Signs - 8 hr











  07/24/18 07/24/18 07/24/18





  05:24 07:40 08:00


 


Temperature  98.5 F 


 


Pulse Rate  84 


 


Respiratory  20 20





Rate   


 


Blood Pressure 122/84 128/64 





(mmHg)   


 


O2 Sat by Pulse  96 





Oximetry   














  07/24/18 07/24/18 07/24/18





  11:14 11:17 11:20


 


Temperature 98.0 F  


 


Pulse Rate 73 78 81


 


Respiratory 16  





Rate   


 


Blood Pressure 113/48 120/53 108/55





(mmHg)   


 


O2 Sat by Pulse 97  





Oximetry   











Oxygen Devices in Use Now: None


Appearance: 74 yo f in nAd, aAOx3


Eyes: No Scleral Icterus, PERRLA


Ears/Nose/Mouth/Throat: NL Teeth, Lips, Gums, Mucous Membranes Moist


Neck: NL Appearance and Movements; NL JVP, Trachea Midline


Respiratory: Symmetrical Chest Expansion and Respiratory Effort, - - faint b/l 

crackles


Cardiovascular: NL Sounds; No Murmurs; No JVD, RRR


Abdominal: NL Sounds; No Tenderness; No Distention


Lymphatic: No Cervical Adenopathy


Extremities: No Clubbing, Cyanosis, - - +1 pitting ankle edema R>L


Skin: No Nodules or Sclerosis, - - left thigh post op incision stapled, no 

dehiscence noted, ecchymosis on left shoulder


Neurological: Alert and Oriented x 3, NL Muscle Strength and Tone


Result Diagrams: 


 07/24/18 05:36





 07/23/18 06:25


Additional Lab and Data: 


 Lab Results











  07/19/18 07/19/18 07/19/18 Range/Units





  09:48 09:54 09:54 


 


WBC   6.5   (3.5-10.8)  10^3/ul


 


RBC   4.95   (4.00-5.40)  10^6/ul


 


Hgb   14.1   (12.0-16.0)  g/dl


 


Hct   42   (35-47)  %


 


MCV   85   (80-97)  fL


 


MCH   29   (27-31)  pg


 


MCHC   34   (31-36)  g/dl


 


RDW   14   (10.5-15)  %


 


Plt Count   193   (150-450)  10^3/ul


 


MPV   7.8   (7.4-10.4)  um3


 


Neut % (Auto)   69.9   (38-83)  %


 


Lymph % (Auto)   13.8 L   (25-47)  %


 


Mono % (Auto)   14.2 H   (0-7)  %


 


Eos % (Auto)   1.4   (0-6)  %


 


Baso % (Auto)   0.7   (0-2)  %


 


Absolute Neuts (auto)   4.5   (1.5-7.7)  10^3/ul


 


Absolute Lymphs (auto)   0.9 L   (1.0-4.8)  10^3/ul


 


Absolute Monos (auto)   0.9 H   (0-0.8)  10^3/ul


 


Absolute Eos (auto)   0.1   (0-0.6)  10^3/ul


 


Absolute Basos (auto)   0   (0-0.2)  10^3/ul


 


Absolute Nucleated RBC   0   10^3/ul


 


Nucleated RBC %   0.3   


 


INR (Anticoag Therapy)    1.02  (0.77-1.02)  


 


APTT    45.1 H  (26.0-36.3)  seconds


 


Sodium     (135-145)  mmol/L


 


Potassium     (3.5-5.0)  mmol/L


 


Chloride     (101-111)  mmol/L


 


Carbon Dioxide     (22-32)  mmol/L


 


Anion Gap     (2-11)  mmol/L


 


BUN     (6-24)  mg/dL


 


Creatinine     (0.51-0.95)  mg/dL


 


Est GFR ( Amer)     (>60)  


 


Est GFR (Non-Af Amer)     (>60)  


 


BUN/Creatinine Ratio     (8-20)  


 


Glucose     ()  mg/dL


 


Lactic Acid     (0.5-2.0)  mmol/L


 


Calcium     (8.6-10.3)  mg/dL


 


Total Bilirubin     (0.2-1.0)  mg/dL


 


AST     (13-39)  U/L


 


ALT     (7-52)  U/L


 


Alkaline Phosphatase     ()  U/L


 


Troponin I     (<0.04)  ng/mL


 


Total Protein     (6.4-8.9)  g/dL


 


Albumin     (3.2-5.2)  g/dL


 


Globulin     (2-4)  g/dL


 


Albumin/Globulin Ratio     (1-3)  


 


Urine Color  Straw    


 


Urine Appearance  Clear    


 


Urine pH  7.0    (5-9)  


 


Ur Specific Gravity  1.003 L    (1.010-1.030)  


 


Urine Protein  Negative    (Negative)  


 


Urine Ketones  1+ A    (Negative)  


 


Urine Blood  Negative    (Negative)  


 


Urine Nitrate  Negative    (Negative)  


 


Urine Bilirubin  Negative    (Negative)  


 


Urine Urobilinogen  Negative    (Negative)  


 


Ur Leukocyte Esterase  Negative    (Negative)  


 


Urine Glucose  Negative    (Negative)  














  07/19/18 07/19/18 Range/Units





  09:54 09:54 


 


WBC    (3.5-10.8)  10^3/ul


 


RBC    (4.00-5.40)  10^6/ul


 


Hgb    (12.0-16.0)  g/dl


 


Hct    (35-47)  %


 


MCV    (80-97)  fL


 


MCH    (27-31)  pg


 


MCHC    (31-36)  g/dl


 


RDW    (10.5-15)  %


 


Plt Count    (150-450)  10^3/ul


 


MPV    (7.4-10.4)  um3


 


Neut % (Auto)    (38-83)  %


 


Lymph % (Auto)    (25-47)  %


 


Mono % (Auto)    (0-7)  %


 


Eos % (Auto)    (0-6)  %


 


Baso % (Auto)    (0-2)  %


 


Absolute Neuts (auto)    (1.5-7.7)  10^3/ul


 


Absolute Lymphs (auto)    (1.0-4.8)  10^3/ul


 


Absolute Monos (auto)    (0-0.8)  10^3/ul


 


Absolute Eos (auto)    (0-0.6)  10^3/ul


 


Absolute Basos (auto)    (0-0.2)  10^3/ul


 


Absolute Nucleated RBC    10^3/ul


 


Nucleated RBC %    


 


INR (Anticoag Therapy)    (0.77-1.02)  


 


APTT    (26.0-36.3)  seconds


 


Sodium  141   (135-145)  mmol/L


 


Potassium  3.6   (3.5-5.0)  mmol/L


 


Chloride  102   (101-111)  mmol/L


 


Carbon Dioxide  29   (22-32)  mmol/L


 


Anion Gap  10   (2-11)  mmol/L


 


BUN  6   (6-24)  mg/dL


 


Creatinine  0.63   (0.51-0.95)  mg/dL


 


Est GFR ( Amer)  111.8   (>60)  


 


Est GFR (Non-Af Amer)  92.4   (>60)  


 


BUN/Creatinine Ratio  9.5   (8-20)  


 


Glucose  107 H   ()  mg/dL


 


Lactic Acid   1.0  (0.5-2.0)  mmol/L


 


Calcium  9.2   (8.6-10.3)  mg/dL


 


Total Bilirubin  1.10 H   (0.2-1.0)  mg/dL


 


AST  15   (13-39)  U/L


 


ALT  11   (7-52)  U/L


 


Alkaline Phosphatase  49   ()  U/L


 


Troponin I  0.00   (<0.04)  ng/mL


 


Total Protein  6.5   (6.4-8.9)  g/dL


 


Albumin  3.9   (3.2-5.2)  g/dL


 


Globulin  2.6   (2-4)  g/dL


 


Albumin/Globulin Ratio  1.5   (1-3)  


 


Urine Color    


 


Urine Appearance    


 


Urine pH    (5-9)  


 


Ur Specific Gravity    (1.010-1.030)  


 


Urine Protein    (Negative)  


 


Urine Ketones    (Negative)  


 


Urine Blood    (Negative)  


 


Urine Nitrate    (Negative)  


 


Urine Bilirubin    (Negative)  


 


Urine Urobilinogen    (Negative)  


 


Ur Leukocyte Esterase    (Negative)  


 


Urine Glucose    (Negative)  











Microbiology and Other Data: 


 Microbiology











 07/20/18 23:53 Nasal Screen MRSA (PCR) - Final





 Nasal    Mrsa Not Detected














Assess/Plan/Problems-Billing


Assessment: 74 yo F with h/o tachycardia presents with mechanical left hip fx 

and left clavicle fx











- Patient Problems


(1) Paroxysmal atrial fibrillation with rapid ventricular response


Comment: 2 episodes so far,one on the day of surgery and another one on 7/22/18


appreciate cardiology consult. Sotalol started on 7/22/18. cont Cardizem.Pt 

needs to bge on telem till 7/25/18, then [facility].


Spoke with Dr. Stiles about anticoagulation:could be started  anytime if 

needed, but  due to the a. fib lasting just hours, will hold off full 

anticoagulation for now due to post op state.also pt's CHADS2 score is 0, but 

CHADSVasc2 score is 2. As per Dr. Granado OK to  start pt on Xarelto. plan to 

start Xarelto in approx 2 days.


Daughter aware that pt will need to be placed on full anticoagulation for 

cardiembolic CVA prevention in  the future.   





(2) Closed left hip fracture


Comment: s/p left hip hemiarthroplasty performed by  on 7/20/18


   





(3) Closed left clavicular fracture


Comment: OK to use sling for comfort, NWB on left arm   





(4) DVT prophylaxis


Comment: lovenox   


Status and Disposition: 


inpatient. Plan to d/c to [facility] on 87/25/18

## 2018-07-25 ENCOUNTER — HOSPITAL ENCOUNTER (INPATIENT)
Dept: HOSPITAL 25 - PMRU | Age: 73
LOS: 9 days | Discharge: HOME HEALTH | DRG: 560 | End: 2018-08-03
Attending: PHYSICAL MEDICINE & REHABILITATION | Admitting: PHYSICAL MEDICINE & REHABILITATION
Payer: MEDICARE

## 2018-07-25 VITALS — SYSTOLIC BLOOD PRESSURE: 116 MMHG | DIASTOLIC BLOOD PRESSURE: 57 MMHG

## 2018-07-25 DIAGNOSIS — Z79.899: ICD-10-CM

## 2018-07-25 DIAGNOSIS — I48.0: ICD-10-CM

## 2018-07-25 DIAGNOSIS — Z88.0: ICD-10-CM

## 2018-07-25 DIAGNOSIS — K21.9: ICD-10-CM

## 2018-07-25 DIAGNOSIS — S72.012D: Primary | ICD-10-CM

## 2018-07-25 DIAGNOSIS — N39.0: ICD-10-CM
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
## 2018-07-25 DIAGNOSIS — B96.20: ICD-10-CM

## 2018-07-25 DIAGNOSIS — Z88.2: ICD-10-CM

## 2018-07-25 DIAGNOSIS — S42.002D: ICD-10-CM

## 2018-07-25 DIAGNOSIS — Z47.1: ICD-10-CM

## 2018-07-25 DIAGNOSIS — Z96.642: ICD-10-CM

## 2018-07-25 DIAGNOSIS — E78.5: ICD-10-CM

## 2018-07-25 DIAGNOSIS — M81.0: ICD-10-CM

## 2018-07-25 LAB
HCT VFR BLD AUTO: 31 % (ref 35–47)
HGB BLD-MCNC: 10.2 G/DL (ref 12–16)

## 2018-07-25 PROCEDURE — F08Z0ZZ BATHING/SHOWERING TECHNIQUES TREATMENT: ICD-10-PCS | Performed by: PHYSICAL MEDICINE & REHABILITATION

## 2018-07-25 PROCEDURE — 87186 SC STD MICRODIL/AGAR DIL: CPT

## 2018-07-25 PROCEDURE — F07Z5ZZ BED MOBILITY TREATMENT: ICD-10-PCS | Performed by: PHYSICAL MEDICINE & REHABILITATION

## 2018-07-25 PROCEDURE — F08Z1ZZ DRESSING TECHNIQUES TREATMENT: ICD-10-PCS | Performed by: PHYSICAL MEDICINE & REHABILITATION

## 2018-07-25 PROCEDURE — F07Z8ZZ TRANSFER TRAINING TREATMENT: ICD-10-PCS | Performed by: PHYSICAL MEDICINE & REHABILITATION

## 2018-07-25 PROCEDURE — 81003 URINALYSIS AUTO W/O SCOPE: CPT

## 2018-07-25 PROCEDURE — 85060 BLOOD SMEAR INTERPRETATION: CPT

## 2018-07-25 PROCEDURE — F08Z3ZZ FEEDING/EATING TREATMENT: ICD-10-PCS | Performed by: PHYSICAL MEDICINE & REHABILITATION

## 2018-07-25 PROCEDURE — 87086 URINE CULTURE/COLONY COUNT: CPT

## 2018-07-25 PROCEDURE — 81015 MICROSCOPIC EXAM OF URINE: CPT

## 2018-07-25 PROCEDURE — 80053 COMPREHEN METABOLIC PANEL: CPT

## 2018-07-25 PROCEDURE — F07Z9ZZ GAIT TRAINING/FUNCTIONAL AMBULATION TREATMENT: ICD-10-PCS | Performed by: PHYSICAL MEDICINE & REHABILITATION

## 2018-07-25 PROCEDURE — 87077 CULTURE AEROBIC IDENTIFY: CPT

## 2018-07-25 PROCEDURE — 36415 COLL VENOUS BLD VENIPUNCTURE: CPT

## 2018-07-25 PROCEDURE — 93005 ELECTROCARDIOGRAM TRACING: CPT

## 2018-07-25 PROCEDURE — 85025 COMPLETE CBC W/AUTO DIFF WBC: CPT

## 2018-07-25 RX ADMIN — ACETAMINOPHEN PRN MG: 325 TABLET ORAL at 09:17

## 2018-07-25 RX ADMIN — ATORVASTATIN CALCIUM SCH MG: 20 TABLET, FILM COATED ORAL at 19:45

## 2018-07-25 RX ADMIN — DOCUSATE SODIUM SCH: 100 CAPSULE, LIQUID FILLED ORAL at 19:14

## 2018-07-25 RX ADMIN — SOTALOL HYDROCHLORIDE SCH MG: 80 TABLET ORAL at 19:46

## 2018-07-25 RX ADMIN — ENOXAPARIN SODIUM SCH MG: 40 INJECTION SUBCUTANEOUS at 09:18

## 2018-07-25 RX ADMIN — DOCUSATE SODIUM SCH: 100 CAPSULE, LIQUID FILLED ORAL at 09:21

## 2018-07-25 RX ADMIN — OMEPRAZOLE SCH MG: 20 CAPSULE, DELAYED RELEASE ORAL at 09:17

## 2018-07-25 RX ADMIN — ACETAMINOPHEN PRN MG: 325 TABLET ORAL at 22:31

## 2018-07-25 RX ADMIN — Medication SCH UNITS: at 09:17

## 2018-07-25 RX ADMIN — SOTALOL HYDROCHLORIDE SCH MG: 80 TABLET ORAL at 09:25

## 2018-07-25 NOTE — XMS REPORT
Melissa Luis

 Created on:2018



Patient:Melissa Luis

Sex:Female

:1945

External Reference #:[id number]





Demographics







 Address  [address]



   [address] [place] [address]

 

 Mobile Phone  [phone]

 

 Email Address  [email or web address]

 

 Preferred Language  English

 

 Marital Status  Not  Or 

 

 Rastafarian Affiliation  Unknown

 

 Race  White

 

 Ethnic Group  Not  Or 









Author







 Organization  [email or web address]

 

 Address  [address]



   [place] [address]

 

 Phone  [phone]









Support







 Name  Relationship  Address  Phone

 

 Karyna Cruz  Unavailable  Unavailable  [phone]









Care Team Providers







 Name  Role  Phone

 

 Nick Charlton MD  Primary Care Physician  Unavailable









Payers







 Type  Date  Identification Numbers  Payment Provider  Subscriber

 

 Medicare Primary    Policy Number: [id number]  Medicare  Melissa Luis









 PayID: [id number]  [address]









 [place] [address]









 [facility]    Policy Number: [id number]  [employer]  Melissa Luis









 Group Number: [id number]  [address]

 

 [place] [address]









 PayID: [id number]







Problems







 Description

 

 No Information







Social History







 Type  Date  Description  Comments

 

 Marital Status    Single  

 

 Lives With    Alone  

 

 Occupation    Retired  

 

 ETOH Use    Denies alcohol use  

 

 Smoking    Patient has never smoked  

 

 Recreational Drug Use    Denies Drug Use  

 

 Exercise Type/Frequency    Does not exercise  







Allergies, Adverse Reactions, Alerts







 Date  Description  Reaction  Status  Severity  Comments

 

 2018  NKDA    active    

 

 2018  Tree Nuts    active    







Medications







 Medication  Date  Status  Form  Strength  Qnty  SIG  Indications  Ordering



                 Provider

 

 Omeprazole    Active  Capsules  20mg    1 by    Unknown



   000    DR      mouth    



             every day    

 

 Rosuvastatin    Active  Tablets      take 1    Unknown



 Calcium  000          tablet by    



             mouth    



             every    



             evening    

 

 Amoxicillin/Cla    Active  Tablets  875-125mg    1 by    Unknown



 vulanate  000          mouth    



 Potassium            twice a    



             day    

 

 Vitamin D3    Active  Capsules  4000Unit    1 by    Unknown



 Super Strength  000          mouth    



             every day    

 

 Cranberry  0  Active  Tablets  450mg        Unknown



   000              

 

 Calcium 600    Active  Tablets  600mg    2 by    Unknown



   000          mouth    



             every day    







Vital Signs







 Date  Vital  Result  Comment

 

 2018  Height  65 inches  5'5"









 Weight  208.00 lb  

 

 Heart Rate  76 /min  

 

 Respiratory Rate  16 /min  

 

 Body Temperature  98.3 F  

 

 BMI (Body Mass Index)  34.6 kg/m2  







Results







 Description

 

 No Information







Procedures







 Date  CPT Code  Description  Status

 

 2018  88924  Chemical Cautery Granulation Tissue  Completed

 

 2018  13924  Removal Devitalization Tissue Wound Less Than Equal 20  
Completed



     Square CM  

 

 2018  56944  Debridement Skin,& sq Tissue  Completed

 

 2018    Colonoscopy  Completed







Encounters







 Type  Date  Location  Provider  CPT E/M  Dx

 

 Office Visit  2018  [facility]  [place],  [id number]  
S81.812D



   9:00a  [facility]  MD    

 

 Office Visit  2018  [facility]  [place],  [address]  
S81.812D



   9:00a  [facility]  MD    

 

 Office Visit  2018  [facility]  [place],  [address]  
S81.812A



   11:45a  [facility]    









 W10.8xxA







Plan of Care

2018 - Carlos Rae, MDS81.812A Laceration without foreign body, 
left lower leg, init encntrFollow up:Wound care as instructed Please make 
appointment next 18 in [facility] for follow upwith Dr. RaeW10.8xxA Fall (on) (from) other stairs and steps, initial encounter

## 2018-07-25 NOTE — XMS REPORT
Melissa Luis

 Created on:2018



Patient:Melissa Luis

Sex:Female

:1945

External Reference #:[id number]





Demographics







 Address  [address]



   [address] [place] [address]

 

 Mobile Phone  [phone]

 

 Email Address  [email or web address]

 

 Preferred Language  English

 

 Marital Status  Not  Or 

 

 Cheondoism Affiliation  Unknown

 

 Race  White

 

 Ethnic Group  Not  Or 









Author







 Organization  [facility] [employer]

 

 Address  [address]



   [place] [address]

 

 Phone  [phone]









Support







 Name  Relationship  Address  Phone

 

 Karyna Cruz  Unavailable  Unavailable  [phone]









Care Team Providers







 Name  Role  Phone

 

 Nick Charlton MD  Primary Care Physician  Unavailable









Payers







 Type  Date  Identification Numbers  Payment Provider  Subscriber

 

 Medicare Primary    Policy Number: [id number]  Medicare  Melissa Luis









 PayID: [id number]  [address]









 [place] [address]









 [facility]    Policy Number: [id number]  [employer]  Melissa Luis









 Group Number: [id number]  [address]

 

 [place]: [place] [address]









 PayID: [id number]







Problems







 Description

 

 No Information







Social History







 Type  Date  Description  Comments

 

 Marital Status    Single  

 

 Lives With    Alone  

 

 Occupation    Retired  

 

 ETOH Use    Denies alcohol use  

 

 Smoking    Patient has never smoked  

 

 Recreational Drug Use    Denies Drug Use  

 

 Exercise Type/Frequency    Does not exercise  







Allergies, Adverse Reactions, Alerts







 Date  Description  Reaction  Status  Severity  Comments

 

 2018  NKDA    active    

 

 2018  Tree Nuts    active    







Medications







 Medication  Date  Status  Form  Strength  Qnty  SIG  Indications  Ordering



                 Provider

 

 Omeprazole    Active  Capsules  20mg    1 by    Unknown



   000    DR      mouth    



             every day    

 

 Rosuvastatin    Active  Tablets      take 1    Unknown



 Calcium  000          tablet by    



             mouth    



             every    



             evening    

 

 Amoxicillin/Cla    Active  Tablets  875-125mg    1 by    Unknown



 vulanate  000          mouth    



 Potassium            twice a    



             day    

 

 Vitamin D3    Active  Capsules  4000Unit    1 by    Unknown



 Super Strength  000          mouth    



             every day    

 

 Cranberry  0  Active  Tablets  450mg        Unknown



   000              

 

 Calcium 600    Active  Tablets  600mg    2 by    Unknown



   000          mouth    



             every day    







Vital Signs







 Date  Vital  Result  Comment

 

 2018  Height  65 inches  5'5"









 Weight  208.00 lb  

 

 Heart Rate  76 /min  

 

 Respiratory Rate  16 /min  

 

 Body Temperature  98.3 F  

 

 BMI (Body Mass Index)  34.6 kg/m2  







Results







 Description

 

 No Information







Procedures







 Date  CPT Code  Description  Status

 

 2018  19159  Open TX Of Femoral FX,Promimal End,Neck Internal  Completed



     Fixation  

 

 2018  85213  Open TX Of Femoral FX,Promimal End,Neck Internal  Completed



     Fixation  

 

 2018  97549  Chemical Cautery Granulation Tissue  Completed

 

 2018  38888  Removal Devitalization Tissue Wound Less Than Equal 20  
Completed



     Square CM  

 

 2018  06291  Debridement Skin,& sq Tissue  Completed

 

 2018    Colonoscopy  Completed







Encounters







 Type  Date  Location  Provider  CPT E/M  Dx

 

 Office Visit  2018  [facility]  [place],  [id number]  
S81.812D



   9:00a  [facility]  MD    

 

 Office Visit  2018  [facility]  [place],  [address]  
S81.812D



   9:00a  [facility]  MD    

 

 Office Visit  2018  [facility]  [place],  [id number]  
S81.812A



   11:45a  Of [facility] LI    









 W10.8xxA







Plan of Care

2018 - [place] [id number] Laceration without foreign body, 
left lower leg, init encntrFollow up:Wound care as instructed Please make 
appointment next 18 [facility] for follow upwith Dr. RaeW10.8xxA Fall (on) (from) other stairs and steps, initial encounter

## 2018-07-25 NOTE — DS
CC:  Dr. Charlton; Dr. Stiles *

 

DATE OF ADMISSION:  07/19/2018.

 

DATE OF DISCHARGE TO [facility]:  07/25/2018.

 

PRIMARY CARE PHYSICIAN:  Dr. Charlton.

 

PRINCIPAL DIAGNOSES:

1.  Left femoral neck fracture.

2.  Left clavicle fracture.

3.  Paroxysmal atrial fibrillation.

 

SECONDARY DIAGNOSES:

1.  Hyperlipidemia.

2.  GERD.

 

DISCHARGE MEDICATIONS:

1.  Crestor 10 mg p.o. at bedtime.

2.  Omeprazole 20 mg p.o. daily.

3.  Vitamin D 4,000 units p.o. daily.

4.  Percocet 5/325 one tab p.o. q.4 hours prn pain.

5.  Sotalol 80 mg p.o. twice daily (new).

6.  Diltiazem  mg p.o. daily.

7.  Tylenol 650 mg p.o. q.4 hours prn pain.

8.  Xarelto 20 mg p.o. daily to be started on 07/26/2018.

 

HOSPITAL COURSE:  Ms. Luis is a 73-year-old female who is generally in good 
health, had been out taking care of a weed in her driveway.  When stood up she 
felt dizzy and fell onto her left hip.  She has pain in the hip and essentially 
crawled her way back into her house.  She had been getting around her home on a 
chair with wheels over the next few days.  As the pain persisted, she 
ultimately decided to present to the emergency room where she was found to have 
a left femoral neck fracture and left clavicle fracture.  The patient was taken 
to the operating room on 07/20/2018 where she underwent a left hip 
hemiarthroplasty.  The patient has been doing well postoperatively, except for 
paroxysmal atrial fibrillation that has been difficult to control.  Ultimately 
on 07/22/2018, the patient was seen by Cardiology due to difficult to control 
paroxysmal atrial fibrillation.  At that time, it was recommended to initiate 
Diltiazem and Sotalol.  The patient has done well with the initiation of these 
medications.  The patient has now been converted over to long-acting Diltiazem 
120 mg daily and continues on Sotalol 80 mg twice daily.  The patient has not 
been started on a full dose anticoagulant; however, her CHADS2-VASc score is 2.
  Because of this, it is recommended that she be started on Xarelto 20 mg 
daily.  This has been okayed by Dr. Stiles.  The patient will need follow-up 
with Dr. Stiles in 10 to 14 days postop.  At this point, it is felt the 
patient is stable for discharge to [facility].

 

On the day of discharge, the patient was awake, alert, and oriented, sitting up 
in a chair and in no acute distress.  Her vital signs are stable with a normal 
blood pressure and heart rate.  She is afebrile and her O2 saturation is mid 
90s on room air.  She is awake, alert and oriented times three.  Her cardiac 
exam reveals a normal S1, S2 with regular rate and rhythm.  There is trace 
right lower extremity edema and 1+ left lower extremity pitting edema.  There 
is a small shallow healing ulceration to the left anterior leg without any 
surrounding erythema.  Her lungs are clear to auscultation bilaterally.  Her 
abdomen is soft, nontender, nondistended.  The patient does admit to having 
frequent diarrhea and is holding off on the stool softeners that she had been 
receiving throughout her hospitalization.

 

Again, the patient is stable for discharge to rehab.

 

FOLLOW-UP CONCERNS:  The patient is being discharged to [facility] today, 07/25/2018. 
The patient should be started on Xarelto 20 mg p.o. daily on 07/26/2018.

 

ACTIVITY LEVEL:  Activity level is as tolerated.  She is nonweightbearing to 
the left upper extremity.

 

DIET:  Regular.

 

CONDITION ON DISCHARGE:  Stable.

 

Thirty-five minutes were spent discharging this patient.

 

 [id number] #: [id number]

MTDD

## 2018-07-25 NOTE — HP
HISTORY AND PHYSICAL:

 

DATE OF ADMISSION:  07/25/18

 

REASON FOR ADMISSION:  Left hip fracture; left clavicle fracture.

 

HISTORY OF PRESENT ILLNESS:  Melissa Luis is a 73-year-old female.  According to 
the patient, on 07/14/18, the patient was in her driveway spraying the driveway 
with [place] or weed killer.  She was looking down and spraying the weeds.  
When she stood up straight, she lost her balance and fell onto her left side.  
She was unable to get up.  She crawled into her house and was able to call her 
daughter. At first, the patient did not think there was anything wrong, but as 
the days went on, her pain was not getting any better and she was not able to 
walk any better. She came to the emergency room on 07/19/18.  In the emergency 
room, she had a shoulder x-ray showing a nondisplaced fracture of the left 
clavicle.  She also had an x-ray of her hip showing a nondisplaced subcapital 
femoral neck fracture on the left.  The patient was admitted to the hospital.  
She was seen in consultation by Dr. Stiles.  It was felt she would need 
surgical fixation of the hip.  She was taken to the operating room on 07/20/18, 
by Dr. Stiles.  She had a yanely-arthroplasty of the left hip.  For her left 
shoulder, she was made nonweightbearing and put in a sling.  



Postoperatively, the patient had an episode of syncope.  She was placed on 
telemetry.  She was found to be in atrial fibrillation with a rapid ventricular 
response.  She was given a diltiazem drip and transferred to the intensive care 
unit.  The patient converted to sinus rhythm after she was given Lopressor.  
She had a cardiology consult with Dr. Cruz.  He recommended starting sotalol 
80 mg twice a day as well as putting her on oral diltiazem.  He also 
recommended correcting her electrolyte abnormalities including her magnesium 
and potassium. The patient's heart rate again went up to 200 on 07/22/18. She 
was given 6 mg of adenosine and it was noted that she was in AFib.  Again, she 
was given Lopressor and she was given digoxin IV.  She had some confusion from 
pain medications and is now only taking Tylenol.  It is felt that she will be 
put on anticoagulation with Xarelto, starting on 07/27/18.  For now, she 
continues on Lovenox.  The patient remains in sinus rhythm.  She was felt to 
have physical therapy and occupational therapy needs.  She is now being 
admitted for inpatient rehab so that she may return to independent living.

 

PAST MEDICAL HISTORY:  Significant for osteoporosis.  She has a history of 
gastroesophageal reflux disease and hyperlipidemia.

 

CURRENT MEDICATIONS:  Include:

1.  Sotalol.

2.  She is on Lipitor.

3.  Cardizem CD.

4.  Lovenox.

5.  Omeprazole.

6.  Tylenol for pain.

 

ALLERGIES:  Include SULFA and PENICILLIN.

 

SOCIAL HISTORY:  She is a nonsmoker, nondrinker.  She lives in a ranch-style 
house by herself.  Her daughter lives about 5 miles down the road.

 

REVIEW OF SYSTEMS:  The patient reports no current shortness of breath or chest 
pain.

 

                               PHYSICAL EXAMINATION

 

VITAL SIGNS:  The patient's temperature is 97.7, blood pressure is 115/53, 
pulse is 73, respirations 24.

 

HEENT:  Her extraocular movements are intact.  Tongue is midline.

 

NECK:  Supple with no lymphadenopathy.

 

LUNGS:  Sound clear to auscultation bilaterally.

 

HEART:  Sounds were regular.  S1 and S2 were audible.

 

ABDOMEN:  Soft and nontender.

 

EXTREMITIES:  Her left hip has a wound, which is clean and dry.  Left shoulder 
is immobilized in a sling.  Her peripheral pulses are intact.

 

NEUROLOGIC:  She is awake, alert, oriented.  Muscle strength is about 4/5 in 
the left leg.  Left arm shows 4+/5 hand  and finger extension.

 

FUNCTIONAL EXAM:  She transfers with mod assist.

 

 ASSESSMENT:  Left clavicle fracture and left hip fracture, status post 
hemiarthroplasty of the left hip fracture.  Her postoperative course was 
notable for atrial fibrillation with rapid ventricular response.

 

PLAN:  We are going to integrate her into a comprehensive and therapeutic rehab 
program.  We will have the following goals:

 

1.  Physical Therapy will work with the patient.  They are going to work on 
functional transfer training, ambulation training with a walker.

2.  Occupational Therapy will see the patient, work on her activities of daily 
living including toileting and toilet transfers.

3.  Continue Lovenox for DVT prophylaxis.  In 2 days, on 07/27/18, we are going 
to start her on Xarelto.

4.  For her atrial fibrillation, we will continue her Betapace and her Cardizem 
CD.

5.  Adequate analgesia.

6.  Her bowels should be regulated.

7.   will be closely involved to make sure that any services and 
equipment the patient requires are in place prior to discharge.

8.  Family training as appropriate.

9.  Home with appropriate services.

 

ESTIMATED LENGTH OF STAY:  10 to 14 days.

 

[id number]/CPS #: [id number]

CARLOS

## 2018-07-25 NOTE — XMS REPORT
Melissa Luis

 Created on:2018



Patient:Melissa Luis

Sex:Female

:1945

External Reference #:[id number]





Demographics







 Address  [address]



   [address] [place] [address]

 

 Mobile Phone  [phone]

 

 Email Address  [email or web address]

 

 Preferred Language  English

 

 Marital Status  Not  Or 

 

 Yazdanism Affiliation  Unknown

 

 Race  White

 

 Ethnic Group  Not  Or 









Author







 Organization  [facility]

 

 Address  [address]



   [place] [address]

 

 Phone  [phone]









Support







 Name  Relationship  Address  Phone

 

 Karyna Cruz  Unavailable  Unavailable  [phone]









Care Team Providers







 Name  Role  Phone

 

 Nick Charlton MD  Primary Care Physician  Unavailable









Payers







 Type  Date  Identification Numbers  Payment Provider  Subscriber

 

 Medicare Primary    Policy Number: [id number]  Medicare  Melissa Luis









 PayID: [id number]  [address]









 [place] [address]









 [facility]    Policy Number: [id number]  [employer]  Melissa Luis









 Group Number: [id number]  [address]

 

 Group [place]: [place] [address]









 PayID: [id number]







Problems







 Description

 

 No Information







Social History







 Type  Date  Description  Comments

 

 Marital Status    Single  

 

 Lives With    Alone  

 

 Occupation    Retired  

 

 ETOH Use    Denies alcohol use  

 

 Smoking    Patient has never smoked  

 

 Recreational Drug Use    Denies Drug Use  

 

 Exercise Type/Frequency    Does not exercise  







Allergies, Adverse Reactions, Alerts







 Date  Description  Reaction  Status  Severity  Comments

 

 2018  NKDA    active    

 

 2018  [place] Nuts    active    







Medications







 Medication  Date  Status  Form  Strength  Qnty  SIG  Indications  Ordering



                 Provider

 

 Omeprazole    Active  Capsules  20mg    1 by    Unknown



   000    DR      mouth    



             every day    

 

 Rosuvastatin    Active  Tablets      take 1    Unknown



 Calcium  000          tablet by    



             mouth    



             every    



             evening    

 

 Amoxicillin/Cla    Active  Tablets  875-125mg    1 by    Unknown



 vulanate  000          mouth    



 Potassium            twice a    



             day    

 

 Vitamin D3    Active  Capsules  4000Unit    1 by    Unknown



 Super Strength  000          mouth    



             every day    

 

 Cranberry  0  Active  Tablets  450mg        Unknown



   000              

 

 Calcium 600    Active  Tablets  600mg    2 by    Unknown



   000          mouth    



             every day    







Vital Signs







 Date  Vital  Result  Comment

 

 2018  Height  65 inches  5'5"









 Weight  208.00 lb  

 

 Heart Rate  76 /min  

 

 Respiratory Rate  16 /min  

 

 Body Temperature  98.3 F  

 

 BMI (Body Mass Index)  34.6 kg/m2  







Results







 Description

 

 No Information







Procedures







 Date  CPT Code  Description  Status

 

 2018  25004  Chemical Cautery Granulation Tissue  Completed

 

 2018  93551  Removal Devitalization Tissue Wound Less Than Equal 20  
Completed



     Square CM  

 

 2018  97435  Debridement Skin,& sq Tissue  Completed

 

 2018    Colonoscopy  Completed







Encounters







 Type  Date  Location  Provider  CPT E/M  Dx

 

 Office Visit  2018  [facility]  [place],  [address]  
S81.812D



   [place]  [facility] [place]    

 

 [place]  2018  [facility]  [place],  [address]  
S81.812D



   9[place]00a  [facility] [place]    

 

 [facility] Visit  2018  [facility]  [place],  [address]  
S81.812A



   11:45a  [facility]    









 W10.8xxA







Plan of Care

2018 - Carlos Rae, MDS81.812A Laceration without foreign body, 
left lower leg, init encntrFollow up:Wound care as instructed Please make 
appointment next 18 in [facility] for follow upwith Dr. RaeW10.8xxA Fall (on) (from) other stairs and steps, initial encounter

## 2018-07-26 LAB
RBC UR QL AUTO: (no result)
WBC UR QL AUTO: (no result)

## 2018-07-26 RX ADMIN — OMEPRAZOLE SCH MG: 20 CAPSULE, DELAYED RELEASE ORAL at 06:30

## 2018-07-26 RX ADMIN — ACETAMINOPHEN PRN MG: 325 TABLET ORAL at 19:53

## 2018-07-26 RX ADMIN — DILTIAZEM HYDROCHLORIDE SCH MG: 120 CAPSULE, COATED, EXTENDED RELEASE ORAL at 07:46

## 2018-07-26 RX ADMIN — SOTALOL HYDROCHLORIDE SCH MG: 80 TABLET ORAL at 07:56

## 2018-07-26 RX ADMIN — Medication SCH UNITS: at 07:46

## 2018-07-26 RX ADMIN — DOCUSATE SODIUM SCH: 100 CAPSULE, LIQUID FILLED ORAL at 07:58

## 2018-07-26 RX ADMIN — ACETAMINOPHEN PRN MG: 325 TABLET ORAL at 07:48

## 2018-07-26 RX ADMIN — ATORVASTATIN CALCIUM SCH MG: 20 TABLET, FILM COATED ORAL at 19:53

## 2018-07-26 RX ADMIN — DOCUSATE SODIUM SCH: 100 CAPSULE, LIQUID FILLED ORAL at 19:49

## 2018-07-26 RX ADMIN — ACETAMINOPHEN PRN MG: 325 TABLET ORAL at 13:17

## 2018-07-26 RX ADMIN — SOTALOL HYDROCHLORIDE SCH MG: 80 TABLET ORAL at 19:53

## 2018-07-26 NOTE — PN
Progress Note


Date of Service: 07/26/18


Note: 


FACUNDO BOCANEGRA was visited. Therapy notes read and reviewed. She complains of 

urinary frequency. She was up several times last night and today. Will check a 

urine. She is scheduled to start Xarelto tomorrow for paroxysmal A fib








Current Medications: 


 Active Medications











Generic Name Dose Route Start Last Admin





  Trade Name Freq  PRN Reason Stop Dose Admin


 


Acetaminophen  650 mg  07/25/18 12:22  07/26/18 13:17





  Tylenol Tab*  PO   650 mg





  Q6H PRN   Administration





  FEVER/PAIN   





     





     





     


 


Atorvastatin Calcium  20 mg  07/25/18 21:00  07/25/18 19:45





  Lipitor*  PO   20 mg





  2100 SUMA   Administration





     





     





     





     


 


Cholecalciferol  4,000 units  07/26/18 09:00  07/26/18 07:46





  Vitamin D Tab*  PO   4,000 units





  DAILY SUMA   Administration





     





     





     





     


 


Diltiazem HCl  120 mg  07/26/18 09:00  07/26/18 07:46





  Cardizem Cd Cap*  PO   120 mg





  DAILY SUMA   Administration





     





     





     





     


 


Docusate Sodium  100 mg  07/25/18 21:00  07/26/18 07:58





  Colace Cap*  PO   Not Given





  BID SUMA   





     





     





     





     


 


Magnesium Hydroxide  30 ml  07/25/18 12:22  





  Milk Of Magnesia Liq*  PO   





  Q6H PRN   





  CONSTIPATION   





     





     





     


 


Omeprazole  20 mg  07/26/18 06:00  07/26/18 06:30





  Prilosec Cap*  PO   20 mg





  0600 SUMA   Administration





     





     





     





     


 


Oxycodone/Acetaminophen  1 tab  07/25/18 12:32  





  Percocet 5/325 Tab*  PO   





  Q4H PRN   





  PAIN - MODERATE TO SEVERE   





     





     





     


 


Rivaroxaban  20 mg  07/27/18 09:00  





  Xarelto(*)  PO   





  DAILY SUMA   





     





     





     





     


 


Senna  2 tab  07/25/18 12:22  





  Senokot Tab*  PO   





  BEDTIME PRN   





  CONSTIPATION   





     





     





     


 


Sotalol HCl  80 mg  07/25/18 21:00  07/26/18 07:56





  Betapace Tab*  PO   80 mg





  BID SUMA   Administration





     





     





     





     


 


Tramadol HCl  50 mg  07/25/18 12:33  





  Ultram*  PO   





  Q6H PRN   





  PAIN - MODERATE   





     





     





     











Vital Signs: 


 Vital Signs











Temp Pulse Resp BP Pulse Ox


 


 98.1 F   75   16   112/57   100 


 


 07/26/18 16:12  07/26/18 16:12  07/26/18 17:44  07/26/18 16:12  07/26/18 16:12











Exam: 





LUNGS:Clear


HEART: regular rhythm


ABDOMEN: Soft, +BS


EXTREMITIES: left arm kept immobilized. Left leg wound C/D/I. 1+ edema at ankles


NEUROLOGIC: alert. Muscle strength 5/5 on right, left leg 3/5 due to pain











Assessment/Plan: 





1. Left hip fracture, S/P hemiarthroplasty: WBAT. PT/OT


2. Left Clavicle fracture: NWB, LUE. 


3. Paroxysmal A. fib: Sotalol/Cardizem. She will start Xarelto tomorrow


4. DVT Prophylaxis: Lovenox for now. Xarelto in am


5. GERD: omeprazole


6. Advanced Directives: Full code


7. Urinary Frequency: Send U/A





07/26/18 18:43





07/26/18 18:47

## 2018-07-27 LAB
BASOPHILS # BLD AUTO: 0 10^3/UL (ref 0–0.2)
EOSINOPHIL # BLD AUTO: 0.2 10^3/UL (ref 0–0.6)
HCT VFR BLD AUTO: 33 % (ref 35–47)
HGB BLD-MCNC: 10.8 G/DL (ref 12–16)
LYMPHOCYTES # BLD AUTO: 0.7 10^3/UL (ref 1–4.8)
MCH RBC QN AUTO: 28 PG (ref 27–31)
MCHC RBC AUTO-ENTMCNC: 33 G/DL (ref 31–36)
MCV RBC AUTO: 85 FL (ref 80–97)
MONOCYTES # BLD AUTO: 1 10^3/UL (ref 0–0.8)
NEUTROPHILS # BLD AUTO: 6.7 10^3/UL (ref 1.5–7.7)
NRBC # BLD AUTO: 0 10^3/UL
NRBC BLD QL AUTO: 0
PLATELET # BLD AUTO: 338 10^3/UL (ref 150–450)
RBC # BLD AUTO: 3.88 10^6/UL (ref 4–5.4)
WBC # BLD AUTO: 8.6 10^3/UL (ref 3.5–10.8)

## 2018-07-27 RX ADMIN — RIVAROXABAN SCH MG: 20 TABLET, FILM COATED ORAL at 09:39

## 2018-07-27 RX ADMIN — DILTIAZEM HYDROCHLORIDE SCH MG: 120 CAPSULE, COATED, EXTENDED RELEASE ORAL at 09:39

## 2018-07-27 RX ADMIN — DOCUSATE SODIUM SCH: 100 CAPSULE, LIQUID FILLED ORAL at 20:43

## 2018-07-27 RX ADMIN — ATORVASTATIN CALCIUM SCH MG: 20 TABLET, FILM COATED ORAL at 20:42

## 2018-07-27 RX ADMIN — OMEPRAZOLE SCH MG: 20 CAPSULE, DELAYED RELEASE ORAL at 04:47

## 2018-07-27 RX ADMIN — SOTALOL HYDROCHLORIDE SCH MG: 80 TABLET ORAL at 09:38

## 2018-07-27 RX ADMIN — DOCUSATE SODIUM SCH: 100 CAPSULE, LIQUID FILLED ORAL at 08:02

## 2018-07-27 RX ADMIN — LEVOFLOXACIN SCH MG: 250 TABLET, FILM COATED ORAL at 18:05

## 2018-07-27 RX ADMIN — ACETAMINOPHEN PRN MG: 325 TABLET ORAL at 09:39

## 2018-07-27 RX ADMIN — Medication SCH UNITS: at 09:39

## 2018-07-27 RX ADMIN — SOTALOL HYDROCHLORIDE SCH MG: 80 TABLET ORAL at 20:42

## 2018-07-27 NOTE — PN
Progress Note


Date of Service: 07/27/18


Note: 


FACUNDO BOCANEGRA was visited. Therapy notes read and reviewed. Patient was 

discussed in interdisciplinary team rounds. Her urine has grown out >100 K E. 

Coli. Will start Levaquin








Current Medications: 


 Active Medications











Generic Name Dose Route Start Last Admin





  Trade Name Freq  PRN Reason Stop Dose Admin


 


Acetaminophen  650 mg  07/25/18 12:22  07/27/18 09:39





  Tylenol Tab*  PO   650 mg





  Q6H PRN   Administration





  FEVER/PAIN   





     





     





     


 


Atorvastatin Calcium  20 mg  07/25/18 21:00  07/26/18 19:53





  Lipitor*  PO   20 mg





  2100 SUMA   Administration





     





     





     





     


 


Cholecalciferol  4,000 units  07/26/18 09:00  07/27/18 09:39





  Vitamin D Tab*  PO   4,000 units





  DAILY SUMA   Administration





     





     





     





     


 


Diltiazem HCl  120 mg  07/26/18 09:00  07/27/18 09:39





  Cardizem Cd Cap*  PO   120 mg





  DAILY SUMA   Administration





     





     





     





     


 


Docusate Sodium  100 mg  07/25/18 21:00  07/27/18 08:02





  Colace Cap*  PO   Not Given





  BID SUMA   





     





     





     





     


 


Magnesium Hydroxide  30 ml  07/25/18 12:22  





  Milk Of Magnesia Liq*  PO   





  Q6H PRN   





  CONSTIPATION   





     





     





     


 


Omeprazole  20 mg  07/26/18 06:00  07/27/18 04:47





  Prilosec Cap*  PO   20 mg





  0600 SUMA   Administration





     





     





     





     


 


Oxycodone/Acetaminophen  1 tab  07/25/18 12:32  





  Percocet 5/325 Tab*  PO   





  Q4H PRN   





  PAIN - MODERATE TO SEVERE   





     





     





     


 


Rivaroxaban  20 mg  07/27/18 09:00  07/27/18 09:39





  Xarelto(*)  PO   20 mg





  DAILY SUMA   Administration





     





     





     





     


 


Senna  2 tab  07/25/18 12:22  





  Senokot Tab*  PO   





  BEDTIME PRN   





  CONSTIPATION   





     





     





     


 


Sotalol HCl  80 mg  07/25/18 21:00  07/27/18 09:38





  Betapace Tab*  PO   80 mg





  BID SUMA   Administration





     





     





     





     


 


Tramadol HCl  50 mg  07/25/18 12:33  





  Ultram*  PO   





  Q6H PRN   





  PAIN - MODERATE   





     





     





     











Vital Signs: 


 Vital Signs











Temp Pulse Resp BP Pulse Ox


 


 98.4 F   72   20   106/53   9 


 


 07/27/18 04:48  07/27/18 11:53  07/27/18 11:49  07/27/18 11:53  07/27/18 07:30











Lab Results: 


 Laboratory Results - last 24 hr











  07/26/18 07/27/18 07/27/18





  19:05 05:46 05:46


 


WBC   8.6 


 


RBC   3.88 L 


 


Hgb   10.8 L 


 


Hct   33 L 


 


MCV   85 


 


MCH   28 


 


MCHC   33 


 


RDW   14 


 


Plt Count   338 


 


MPV   6.9 L 


 


Neut % (Auto)   77.9 


 


Lymph % (Auto)   7.7 L 


 


Mono % (Auto)   12.0 H 


 


Eos % (Auto)   1.8 


 


Baso % (Auto)   0.6 


 


Absolute Neuts (auto)   6.7 


 


Absolute Lymphs (auto)   0.7 L 


 


Absolute Monos (auto)   1.0 H 


 


Absolute Eos (auto)   0.2 


 


Absolute Basos (auto)   0 


 


Absolute Nucleated RBC   0 


 


Nucleated RBC %   0 


 


Sodium    140


 


Potassium    4.6


 


Chloride    105


 


Carbon Dioxide    31


 


Anion Gap    4


 


BUN    8


 


Creatinine    0.64


 


Est GFR ( Amer)    109.8


 


Est GFR (Non-Af Amer)    90.7


 


BUN/Creatinine Ratio    12.5


 


Glucose    104 H


 


Calcium    8.6


 


Total Bilirubin    0.60


 


AST    11 L


 


ALT    15


 


Alkaline Phosphatase    54


 


Total Protein    5.2 L


 


Albumin    2.9 L


 


Globulin    2.3


 


Albumin/Globulin Ratio    1.3


 


Urine Color  Yellow  


 


Urine Appearance  Cloudy  


 


Urine pH  7.0  


 


Ur Specific Peculiar  1.011  


 


Urine Protein  Negative  


 


Urine Ketones  Negative  


 


Urine Blood  Negative  


 


Urine Nitrate  Positive A  


 


Urine Bilirubin  Negative  


 


Urine Urobilinogen  Negative  


 


Ur Leukocyte Esterase  3+ A  


 


Urine WBC (Auto)  3+(>20/hpf) A  


 


Urine RBC (Auto)  Trace(0-2/hpf)  


 


Ur Squamous Epith Cells  Present A  


 


Urine Bacteria  1+ A  


 


Urine Glucose  Negative  











Exam: 





LUNGS:Clear


HEART: regular rhythm


ABDOMEN: Soft, +BS


EXTREMITIES: left arm kept immobilized. Left leg wound C/D/I. 1+ edema at ankles


NEUROLOGIC: alert. Muscle strength 5/5 on right, left leg 3/5 due to pain





Assessment/Plan: 





1. Left hip fracture, S/P hemiarthroplasty: WBAT. PT/OT


2. Left Clavicle fracture: NWB, LUE. 


3. Paroxysmal A. fib: Sotalol/Cardizem. She started Xarelto


4. DVT Prophylaxis: Xarelto


5. GERD: omeprazole


6. Advanced Directives: Full code


7. Urinary Tract Infection: Levaquin, day #1/6








07/27/18 16:26

## 2018-07-27 NOTE — PMRUTEAM
PMRU: Team Meeting


Current Status: 


 Nursing: Current Status











Skin Deviations [Left hip      Blister





distal to incision]            


 


Skin Deviations [Bilateral     Other





Buttocks]                      


 


Skin Deviations [Left Lower    Wound





Leg]                           


 


Skin Deviations [Left Hip]     Incision


 


Skin Deviations [Right         Bruise





Shoulder]                      


 


Skin Deviation Description [   anterior one intact, posterior one open- tegaderm





Left hip distal to incision]   applied


 


Skin Deviation Description [   redness gone.





Bilateral Buttocks]            


 


Skin Deviation Description [   dressing intact





Left Lower Leg]                


 


Skin Deviation Description [   healing





Right Shoulder]                


 


Drain Type [Left Lower Leg]    None


 


Drain Type [Left Hip]          None


 


Bladder Current Status         continent


 


Bowel Current Status           continent


 


Nutrition Current Status       good


 


Medication Current Status      needs reinforcement











 Physical Therapy: Current Status











Bed Mobility Assistance        Mod Assist


 


Transfer Moblility Assistance  Mod Assist


 


Transfer/Bed Mobility          Rayshawn Walker





Recommended Devices            


 


Ambulation Assistance          Contact Guard Assist


 


Ambulation Assistive Devices   Rayshawn Walker


 


Number of Feet Patient         40





Ambulated                      














 Occupational Therapy: Current Status











Upper Body Dressing            Max Asst


 


Lower Body Dressing            Mod Assist


 


Bathing                        Mod Assist


 


Toileting                      Total Assist,2 Person Assist


 


Toilet Transfer                Mod Assist


 


Shower Transfer                Mod Assist














 Rec Therapy: Current Status











Summary of Assessment and      Pt. was open to conversation - cooperative and





Clinical Impression            engaged throughout.  Pt. identified with 

interests





 and involvement in most of them prior to





 admission.  Pt. states she enjoys her life and was





 open to continued visits.


 


Treatment Goals                Pt. will engage in leisure activities while on 

the





 unit.


 


Treatment Plan                 Provide RT services and encourage involvement.














 Social Work: Current Status











Discharge Plan                 return home with home care svs and family support


 


Potential for Family Training  pt's daughter is involved and supportive


 


Anticipated Discharge          Home





Destination                    


 


Discharge With                 home care svs and family support

















Goals: 


 Physical Therapy: Initial Goals











Bed Mobility Assistance        Independent


 


Transfer Mobility Assistance   Independent


 


Transfer/Bed Mobility          Large Base Quad Cane,Rayshawn Walker





Recommended Devices            


 


Ambulation                     Independent


 


Ambulation Recommended Devices Large Base Quad Cane,Rayshawn Walker


 


Ambulation Distance            150


 


Stairs Assistance              Independent


 


Stair Recommended Devices      One Rail


 


Number of Stairs               5














 Physical Therapy: Updated Goals











Transfer/Bed Mobility          Rayshawn Walker





Recommended Devices            














 Occupational Therapy: Initial Goals











Goals to be Completed in (Days 14-21





)                              


 


Upper Body Bathing Routine     Independent


 


Lower Body Bathing Routine     Modified Independent with


 


Upper Body Dressing Routine    Independent


 


Lower Body Dressing Routine    Modified Independent with


 


Toilet Hygeine and Clothing    Modified Independent with





Management Routine             


 


Toilet Transfer Routine        Modified Independent with


 


Step-In Shower Transfer        Modified Independent with





Routine                        


 


Tub Transfer Routine           Modified Independent with


 


Functional Transfers for ADL   Modified Independent with


 


Grooming Routine               Independent


 


Feeding Routine                Independent














 Nursing: Goals











Bladder Goal                   indep


 


Bowel Goal                     indep


 


Nutrition Goal                 100% of meals


 


Medication Goal                suppervision to indep














 Social Work: Goals











Discharge Plan                 return home with home care svs and family support


 


Potential for Family Training  pt's daughter is involved and supportive


 


Anticipated Discharge          Home





Destination                    


 


Discharge With                 home care svs and family support

















Care Plan: 


 Care Plan





ADL's - Improve/Maintain                Start:  07/26/18 15:10              


Freq:   DAILY                           Status: Active      Target:         


Protocol:                                                                   








Activity Type Activity Date Activity User E-Sign Co-Sign Detail





Recorded Client Recorded Date Recorded By   


 


Document 07/26/18 15:10 [id number]   





PMRU-C04 07/26/18 15:10 [id number]   














  07/26/18





  15:10


 


PMRU Outcome: ADL's/ADL Transfers 


 


Orders/Interventions Occupational





 Therapy





 Evaluation &





 Treatment





 Communication





 Tool in Patient





 Room


 


Patient to receive OT 5x/wk for  Therex





min/day Self Care





 Management





 Group Therapy


 


UE/LE ADL's with Assist Yes: mod I


 


ADL Transfers with Assist Yes: mod I


 


Toileting: Transfers,Clothing Management Yes: mod I





,Hygeine w/Assist 


 


Progression Toward Outcome/Goals Progressing


 


Outcome/Goals Met Pt demonstrates





 with L UE NWB





 precautions,





 decreased LUE





 ROM/strength,





 decreased





 endurance,





 decreased





 static and





 dynamic





 standing





 balance, and





 decreased





 functional





 independence in





 all ADLs. Pt





 lives alone at





 home and her





 dtr would not





 be able to A





 with ADLs PRN.





 Pt would





 benefit from





 skilled OT





 services as





 part of a rehab





 protocol to





 improve balance





 , endurance,





 strength, and





 functional





 independence in





 all ADLs to





 allow pt to





 return home





 safely and with





 maximal





 functional





 independence.








Cardiovascular- Improve/Maintain        Start:  07/25/18 16:01              


Freq:   QSHIFT                          Status: Active      Target:         


Protocol:                                                                   








Activity Type Activity Date Activity User E-Sign Co-Sign Detail





Recorded Client Recorded Date Recorded By   


 


Document 07/27/18 07:30 [id number]   





PMRU-C07 07/27/18 10:05 [id number]   














  07/27/18





  07:30


 


PMRU Outcome: Cardiovascular 


 


Vital Signs q Shift for 48hrs Then BID Yes


 


Daily Weight Ordered No


 


Current Cardiovascular Outcome/Goal Maintain/





 Achieve





 Baseline HR, BP





 , Perfusion


 


Progression Toward Outcome/Goal Progressing








DVT Prophylaxis- Improve/Maintain       Start:  07/25/18 16:01              


Freq:   QSHIFT                          Status: Active      Target:         


Protocol:                                                                   








Activity Type Activity Date Activity User E-Sign Co-Sign Detail





Recorded Client Recorded Date Recorded By   


 


Document 07/27/18 07:30 [id number]   





PMRU-C07 07/27/18 10:05 [id number]   














  07/27/18





  07:30


 


PMRU Outcome: DVT Prophylaxis 


 


Outcome/Goals Remains Free of





 DVT





 Complies with





 DVT Prophylaxis





 /Treatment





 Other


 


Other Outcome/Goals Ace wraps a.m.,





 off p.m.


 


Progression Toward Outcome/Goals Progressing


 


Outcome/Goals Met Remains Free of





 DVT








Discharge Planning - Improve/Maintain   Start:  07/25/18 16:01              


Freq:   DAILY                           Status: Active      Target:         


Protocol:                                                                   








Activity Type Activity Date Activity User E-Sign Co-Sign Detail





Recorded Client Recorded Date Recorded By   


 


Document 07/27/18 01:55 [id number]   





PMRU-C03 07/27/18 01:58 [id number]   














  07/27/18





  01:55


 


PMRU Outcome: Discharge Planning 


 


Update Patient Family No


 


Outcome/Goals Demonstrates





 Understanding





 of Discharge





 Plan


 


Progression Toward Outcome/Goals Progressing








Education-Improve/Maintain              Start:  07/25/18 16:01              


Freq:   QSHIFT                          Status: Active      Target:         


Protocol:                                                                   








Activity Type Activity Date Activity User E-Sign Co-Sign Detail





Recorded Client Recorded Date Recorded By   


 


Document 07/27/18 07:30 [id number]   





PMRU-C07 07/27/18 10:05 [id number]   














  07/27/18





  07:30


 


PMRU Outcome: Education 


 


Outcome/Goals Demonstrate/





 Verbalize





 Understanding





 of Written





 Discharge





 Instructions





 Demonstrates





 Skills





 Encourage





 Questions


 


Progression Toward Outcome/Goals Progressing








/GI-Improve/Maintain                  Start:  07/25/18 16:01              


Freq:   QSHIFT                          Status: Active      Target:         


Protocol:                                                                   








Activity Type Activity Date Activity User E-Sign Co-Sign Detail





Recorded Client Recorded Date Recorded By   


 


Document 07/27/18 07:30 [id number]   





PMRU-C07 07/27/18 10:05 [id number]   














  07/27/18





  07:30


 


PMRU Outcome: Genitourinary/ 





Gastrointestinal 


 


Genitourinary- Outcome/Goals Maintain/





 Achieve Urinary





 Continence


 


Gastrointestinal-Outcome/Goals Maintain/





 Achieve Bowel





 Regularity in





 Accordance with





 Pt's Baseline


 


Progression Toward Outcome/Goals -  Progressing


 


Progression Toward Outcome/Goals - GI Progressing


 


Outcome/Goals Met Comment pt up to





 commode








Medication Administration               Start:  07/25/18 16:01              


Freq:   QSHIFT                          Status: Active      Target:         


Protocol:                                                                   








Activity Type Activity Date Activity User E-Sign Co-Sign Detail





Recorded Client Recorded Date Recorded By   


 


Document 07/27/18 07:30 [id number]   





PMRU-C07 07/27/18 10:05 [id number]   














  07/27/18





  07:30


 


PMRU Outcome: Medication Administration 


 


Assess Patient Knowledge/Teach Med Yes





Education for all Meds 


 


Outcome/Goals Demonstrates





 Understanding


 


Progression Towards Outcome/Goals Progressing


 


Is Patient Going Home on Lovenox? Yes


 


If Patient is Going Home on Lovenox, Who unsure





Will Administer 








Pain/Comfort- Improve/Maintain          Start:  07/25/18 16:01              


Freq:   QSHIFT                          Status: Active      Target:         


Protocol:                                                                   








Activity Type Activity Date Activity User E-Sign Co-Sign Detail





Recorded Client Recorded Date Recorded By   


 


Document 07/27/18 07:30 [id number]   





PMRU-C07 07/27/18 10:05 [id number]   














  07/27/18





  07:30


 


PMRU Outcome: Pain/Comfort 


 


Outcome/Goals Demonstrates





 Knowledge and





 Use of





 Available





 Comfort





 Measures





 Achieves





 Acceptable





 Comfort/Pain





 Level as





 Determined by





 Patient/Condit





 Maintain





 Comfort Level





 Allowing





 Patient to





 Fully





 Participate in





 Rehab


 


Progression Toward Outcome/Goals Progressing








Skin- Improve/Maintain                  Start:  07/25/18 16:01              


Freq:   QSHIFT                          Status: Active      Target:         


Protocol:                                                                   








Activity Type Activity Date Activity User E-Sign Co-Sign Detail





Recorded Client Recorded Date Recorded By   


 


Document 07/27/18 07:30 [id number]   





PMRU-C07 07/27/18 10:05 [id number]   














  07/27/18





  07:30


 


PMRU Outcome: Skin 


 


Skin Risk Level Medium


 


Outcome/Goals Maintain/





 Improve Skin





 Intergrity





 Surgical





 Incisions





 Healing


 


Progression Toward Outcome/Goals Progressing














Medicine Note: 








Length of Stay:  1 week





Anticipated Discharge Destination: Home





Tentative Discharge Date:  08/03/18





Discharged to:  Home

## 2018-07-28 RX ADMIN — SOTALOL HYDROCHLORIDE SCH MG: 80 TABLET ORAL at 08:10

## 2018-07-28 RX ADMIN — LEVOFLOXACIN SCH MG: 250 TABLET, FILM COATED ORAL at 16:57

## 2018-07-28 RX ADMIN — ATORVASTATIN CALCIUM SCH MG: 20 TABLET, FILM COATED ORAL at 20:26

## 2018-07-28 RX ADMIN — OMEPRAZOLE SCH MG: 20 CAPSULE, DELAYED RELEASE ORAL at 05:24

## 2018-07-28 RX ADMIN — Medication SCH UNITS: at 08:09

## 2018-07-28 RX ADMIN — ACETAMINOPHEN PRN MG: 325 TABLET ORAL at 08:26

## 2018-07-28 RX ADMIN — DOCUSATE SODIUM SCH: 100 CAPSULE, LIQUID FILLED ORAL at 08:12

## 2018-07-28 RX ADMIN — RIVAROXABAN SCH MG: 20 TABLET, FILM COATED ORAL at 08:09

## 2018-07-28 RX ADMIN — DOCUSATE SODIUM SCH: 100 CAPSULE, LIQUID FILLED ORAL at 20:27

## 2018-07-28 RX ADMIN — SOTALOL HYDROCHLORIDE SCH MG: 80 TABLET ORAL at 20:26

## 2018-07-28 RX ADMIN — DILTIAZEM HYDROCHLORIDE SCH MG: 120 CAPSULE, COATED, EXTENDED RELEASE ORAL at 08:10

## 2018-07-28 NOTE — PN
Progress Note


Date of Service: 07/28/18


Note: 


FACUNDO BOCANEGRA was visited. Therapy notes read and reviewed. She is on Levaquin 

for her UTI and believes that it is helpful. She has less frequency. EKG not 

done yet








Current Medications: 


 Active Medications











Generic Name Dose Route Start Last Admin





  Trade Name Freq  PRN Reason Stop Dose Admin


 


Acetaminophen  650 mg  07/25/18 12:22  07/28/18 08:26





  Tylenol Tab*  PO   650 mg





  Q6H PRN   Administration





  FEVER/PAIN   





     





     





     


 


Atorvastatin Calcium  20 mg  07/25/18 21:00  07/27/18 20:42





  Lipitor*  PO   20 mg





  2100 SUMA   Administration





     





     





     





     


 


Cholecalciferol  4,000 units  07/26/18 09:00  07/28/18 08:09





  Vitamin D Tab*  PO   4,000 units





  DAILY SUMA   Administration





     





     





     





     


 


Diltiazem HCl  120 mg  07/26/18 09:00  07/28/18 08:10





  Cardizem Cd Cap*  PO   120 mg





  DAILY SUMA   Administration





     





     





     





     


 


Docusate Sodium  100 mg  07/25/18 21:00  07/28/18 08:12





  Colace Cap*  PO   Not Given





  BID SUMA   





     





     





     





     


 


Levofloxacin  250 mg  07/27/18 17:00  07/27/18 18:05





  Levaquin Tab*  PO   250 mg





  Q24H SUMA   Administration





     





     





     





     


 


Magnesium Hydroxide  30 ml  07/25/18 12:22  





  Milk Of Magnesia Liq*  PO   





  Q6H PRN   





  CONSTIPATION   





     





     





     


 


Omeprazole  20 mg  07/26/18 06:00  07/28/18 05:24





  Prilosec Cap*  PO   20 mg





  0600 SUMA   Administration





     





     





     





     


 


Oxycodone/Acetaminophen  1 tab  07/25/18 12:32  





  Percocet 5/325 Tab*  PO   





  Q4H PRN   





  PAIN - MODERATE TO SEVERE   





     





     





     


 


Rivaroxaban  20 mg  07/27/18 09:00  07/28/18 08:09





  Xarelto(*)  PO   20 mg





  DAILY SUMA   Administration





     





     





     





     


 


Senna  2 tab  07/25/18 12:22  





  Senokot Tab*  PO   





  BEDTIME PRN   





  CONSTIPATION   





     





     





     


 


Sotalol HCl  80 mg  07/25/18 21:00  07/28/18 08:10





  Betapace Tab*  PO   80 mg





  BID SUMA   Administration





     





     





     





     


 


Tramadol HCl  50 mg  07/25/18 12:33  





  Ultram*  PO   





  Q6H PRN   





  PAIN - MODERATE   





     





     





     











Vital Signs: 


 Vital Signs











Temp Pulse Resp BP Pulse Ox


 


 98.7 F   73   20   123/67   98 


 


 07/28/18 05:16  07/28/18 05:16  07/28/18 05:16  07/28/18 05:16  07/28/18 08:00











Exam: 





LUNGS:Clear


HEART: regular rhythm


ABDOMEN: Soft, +BS


EXTREMITIES: left arm kept immobilized. Left leg wound C/D/I. 1+ edema at ankles


NEUROLOGIC: alert. Muscle strength 5/5 on right, left leg 3/5 due to pain


Assessment/Plan: 





1. Left hip fracture, S/P hemiarthroplasty: WBAT. PT/OT


2. Left Clavicle fracture: NWB, LUE. 


3. Paroxysmal A. fib: Sotalol/Cardizem. She started Xarelto. Check EKG


4. DVT Prophylaxis: Xarelto


5. GERD: omeprazole


6. Advanced Directives: Full code


7. Urinary Tract Infection: Levaquin, day #2/6














07/28/18 13:12

## 2018-07-29 RX ADMIN — DOCUSATE SODIUM SCH: 100 CAPSULE, LIQUID FILLED ORAL at 07:34

## 2018-07-29 RX ADMIN — LEVOFLOXACIN SCH MG: 250 TABLET, FILM COATED ORAL at 17:27

## 2018-07-29 RX ADMIN — ATORVASTATIN CALCIUM SCH MG: 20 TABLET, FILM COATED ORAL at 20:10

## 2018-07-29 RX ADMIN — RIVAROXABAN SCH MG: 20 TABLET, FILM COATED ORAL at 08:05

## 2018-07-29 RX ADMIN — OMEPRAZOLE SCH MG: 20 CAPSULE, DELAYED RELEASE ORAL at 05:26

## 2018-07-29 RX ADMIN — DILTIAZEM HYDROCHLORIDE SCH MG: 120 CAPSULE, COATED, EXTENDED RELEASE ORAL at 08:06

## 2018-07-29 RX ADMIN — SOTALOL HYDROCHLORIDE SCH MG: 80 TABLET ORAL at 08:05

## 2018-07-29 RX ADMIN — DOCUSATE SODIUM SCH: 100 CAPSULE, LIQUID FILLED ORAL at 20:11

## 2018-07-29 RX ADMIN — ACETAMINOPHEN PRN MG: 325 TABLET ORAL at 08:05

## 2018-07-29 RX ADMIN — Medication SCH UNITS: at 08:06

## 2018-07-29 RX ADMIN — SOTALOL HYDROCHLORIDE SCH MG: 80 TABLET ORAL at 20:10

## 2018-07-29 NOTE — PN
Progress Note


Date of Service: 07/29/18


Note: 


FACUNDO BOCANEGRA was visited. Nursing notes read and reviewed. She feels like her 

infection is getting better. Pain controlled








Current Medications: 


 Active Medications











Generic Name Dose Route Start Last Admin





  Trade Name Freq  PRN Reason Stop Dose Admin


 


Acetaminophen  650 mg  07/25/18 12:22  07/29/18 08:05





  Tylenol Tab*  PO   650 mg





  Q6H PRN   Administration





  FEVER/PAIN   





     





     





     


 


Atorvastatin Calcium  20 mg  07/25/18 21:00  07/28/18 20:26





  Lipitor*  PO   20 mg





  2100 SUMA   Administration





     





     





     





     


 


Cholecalciferol  4,000 units  07/26/18 09:00  07/29/18 08:06





  Vitamin D Tab*  PO   4,000 units





  DAILY SUMA   Administration





     





     





     





     


 


Diltiazem HCl  120 mg  07/26/18 09:00  07/29/18 08:06





  Cardizem Cd Cap*  PO   120 mg





  DAILY SUMA   Administration





     





     





     





     


 


Docusate Sodium  100 mg  07/25/18 21:00  07/29/18 07:34





  Colace Cap*  PO   Not Given





  BID SUMA   





     





     





     





     


 


Levofloxacin  250 mg  07/27/18 17:00  07/28/18 16:57





  Levaquin Tab*  PO   250 mg





  Q24H SUMA   Administration





     





     





     





     


 


Magnesium Hydroxide  30 ml  07/25/18 12:22  





  Milk Of Magnesia Liq*  PO   





  Q6H PRN   





  CONSTIPATION   





     





     





     


 


Omeprazole  20 mg  07/26/18 06:00  07/29/18 05:26





  Prilosec Cap*  PO   20 mg





  0600 SUMA   Administration





     





     





     





     


 


Oxycodone/Acetaminophen  1 tab  07/25/18 12:32  





  Percocet 5/325 Tab*  PO   





  Q4H PRN   





  PAIN - MODERATE TO SEVERE   





     





     





     


 


Rivaroxaban  20 mg  07/27/18 09:00  07/29/18 08:05





  Xarelto(*)  PO   20 mg





  DAILY SUMA   Administration





     





     





     





     


 


Senna  2 tab  07/25/18 12:22  





  Senokot Tab*  PO   





  BEDTIME PRN   





  CONSTIPATION   





     





     





     


 


Sotalol HCl  80 mg  07/25/18 21:00  07/29/18 08:05





  Betapace Tab*  PO   80 mg





  BID SUMA   Administration





     





     





     





     


 


Tramadol HCl  50 mg  07/25/18 12:33  





  Ultram*  PO   





  Q6H PRN   





  PAIN - MODERATE   





     





     





     











Vital Signs: 


 Vital Signs











Temp Pulse Resp BP Pulse Ox


 


 97.5 F   74   18   111/55   97 


 


 07/29/18 05:32  07/29/18 05:32  07/29/18 15:33  07/29/18 05:32  07/29/18 15:33











Exam: 





LUNGS:Clear


HEART: regular rhythm


ABDOMEN: Soft, +BS


EXTREMITIES: left arm kept immobilized. Left leg wound C/D/I. 1+ edema at ankles


NEUROLOGIC: alert. Muscle strength 5/5 on right, left leg 3/5 due to pain


Assessment/Plan: 





1. Left hip fracture, S/P hemiarthroplasty: WBAT. PT/OT


2. Left Clavicle fracture: NWB, LUE. 


3. Paroxysmal A. fib: Sotalol/Cardizem. She started Xarelto. EKG:NSR


4. DVT Prophylaxis: Xarelto


5. GERD: omeprazole


6. Advanced Directives: Full code


7. Urinary Tract Infection: Levaquin, day #3/6




















07/29/18 15:48

## 2018-07-30 LAB
BASOPHILS # BLD AUTO: 0.1 10^3/UL (ref 0–0.2)
EOSINOPHIL # BLD AUTO: 0.1 10^3/UL (ref 0–0.6)
HCT VFR BLD AUTO: 33 % (ref 35–47)
HGB BLD-MCNC: 11.2 G/DL (ref 12–16)
LYMPHOCYTES # BLD AUTO: 1.7 10^3/UL (ref 1–4.8)
MCH RBC QN AUTO: 28 PG (ref 27–31)
MCHC RBC AUTO-ENTMCNC: 34 G/DL (ref 31–36)
MCV RBC AUTO: 84 FL (ref 80–97)
MONOCYTES # BLD AUTO: 1.1 10^3/UL (ref 0–0.8)
NEUTROPHILS # BLD AUTO: 4.3 10^3/UL (ref 1.5–7.7)
NRBC # BLD AUTO: 0 10^3/UL
NRBC BLD QL AUTO: 0.1
PLATELET # BLD AUTO: 381 10^3/UL (ref 150–450)
RBC # BLD AUTO: 3.95 10^6/UL (ref 4–5.4)
WBC # BLD AUTO: 7.2 10^3/UL (ref 3.5–10.8)

## 2018-07-30 RX ADMIN — ACETAMINOPHEN PRN MG: 325 TABLET ORAL at 10:00

## 2018-07-30 RX ADMIN — Medication SCH UNITS: at 08:42

## 2018-07-30 RX ADMIN — LEVOFLOXACIN SCH MG: 250 TABLET, FILM COATED ORAL at 17:05

## 2018-07-30 RX ADMIN — RIVAROXABAN SCH MG: 20 TABLET, FILM COATED ORAL at 08:42

## 2018-07-30 RX ADMIN — SOTALOL HYDROCHLORIDE SCH MG: 80 TABLET ORAL at 08:42

## 2018-07-30 RX ADMIN — SOTALOL HYDROCHLORIDE SCH MG: 80 TABLET ORAL at 20:05

## 2018-07-30 RX ADMIN — DILTIAZEM HYDROCHLORIDE SCH MG: 120 CAPSULE, COATED, EXTENDED RELEASE ORAL at 08:42

## 2018-07-30 RX ADMIN — DOCUSATE SODIUM SCH: 100 CAPSULE, LIQUID FILLED ORAL at 07:33

## 2018-07-30 RX ADMIN — OMEPRAZOLE SCH MG: 20 CAPSULE, DELAYED RELEASE ORAL at 05:44

## 2018-07-30 RX ADMIN — DOCUSATE SODIUM SCH: 100 CAPSULE, LIQUID FILLED ORAL at 20:05

## 2018-07-30 RX ADMIN — ATORVASTATIN CALCIUM SCH MG: 20 TABLET, FILM COATED ORAL at 20:05

## 2018-07-30 NOTE — PN
Progress Note


Date of Service: 07/30/18


Note: 


FACUNDO BOCANEGRA was visited. Therapy notes read and reviewed. She seems to be 

doing ok. I met with her and her daughter today. Her EKG was normal and she has 

had no episodes of lightheadedness today








Current Medications: 


 Active Medications











Generic Name Dose Route Start Last Admin





  Trade Name Freq  PRN Reason Stop Dose Admin


 


Acetaminophen  650 mg  07/25/18 12:22  07/30/18 10:00





  Tylenol Tab*  PO   650 mg





  Q6H PRN   Administration





  FEVER/PAIN   





     





     





     


 


Atorvastatin Calcium  20 mg  07/25/18 21:00  07/29/18 20:10





  Lipitor*  PO   20 mg





  2100 SUMA   Administration





     





     





     





     


 


Cholecalciferol  4,000 units  07/26/18 09:00  07/30/18 08:42





  Vitamin D Tab*  PO   4,000 units





  DAILY SUMA   Administration





     





     





     





     


 


Diltiazem HCl  120 mg  07/26/18 09:00  07/30/18 08:42





  Cardizem Cd Cap*  PO   120 mg





  DAILY SUMA   Administration





     





     





     





     


 


Docusate Sodium  100 mg  07/25/18 21:00  07/30/18 07:33





  Colace Cap*  PO   Not Given





  BID SUMA   





     





     





     





     


 


Levofloxacin  250 mg  07/27/18 17:00  07/30/18 17:05





  Levaquin Tab*  PO   250 mg





  Q24H SUMA   Administration





     





     





     





     


 


Magnesium Hydroxide  30 ml  07/25/18 12:22  





  Milk Of Magnesia Liq*  PO   





  Q6H PRN   





  CONSTIPATION   





     





     





     


 


Omeprazole  20 mg  07/26/18 06:00  07/30/18 05:44





  Prilosec Cap*  PO   20 mg





  0600 SUMA   Administration





     





     





     





     


 


Oxycodone/Acetaminophen  1 tab  07/25/18 12:32  





  Percocet 5/325 Tab*  PO   





  Q4H PRN   





  PAIN - MODERATE TO SEVERE   





     





     





     


 


Rivaroxaban  20 mg  07/27/18 09:00  07/30/18 08:42





  Xarelto(*)  PO   20 mg





  DAILY SUMA   Administration





     





     





     





     


 


Senna  2 tab  07/25/18 12:22  





  Senokot Tab*  PO   





  BEDTIME PRN   





  CONSTIPATION   





     





     





     


 


Sotalol HCl  80 mg  07/25/18 21:00  07/30/18 08:42





  Betapace Tab*  PO   80 mg





  BID SUMA   Administration





     





     





     





     


 


Tramadol HCl  50 mg  07/25/18 12:33  





  Ultram*  PO   





  Q6H PRN   





  PAIN - MODERATE   





     





     





     











Vital Signs: 


 Vital Signs











Temp Pulse Resp BP Pulse Ox


 


 98.3 F   68   16   96/49   100 


 


 07/30/18 15:46  07/30/18 15:46  07/30/18 15:46  07/30/18 15:46  07/30/18 15:46











Lab Results: 


 Laboratory Results - last 24 hr











  07/30/18





  05:33


 


WBC  7.2


 


RBC  3.95 L


 


Hgb  11.2 L


 


Hct  33 L


 


MCV  84


 


MCH  28


 


MCHC  34


 


RDW  14


 


Plt Count  381


 


MPV  7.2 L


 


Neut % (Auto)  59.6


 


Lymph % (Auto)  23.2 L


 


Mono % (Auto)  15.3 H


 


Eos % (Auto)  0.7


 


Baso % (Auto)  1.2


 


Absolute Neuts (auto)  4.3


 


Absolute Lymphs (auto)  1.7


 


Absolute Monos (auto)  1.1 H


 


Absolute Eos (auto)  0.1


 


Absolute Basos (auto)  0.1


 


Absolute Nucleated RBC  0


 


Nucleated RBC %  0.1











Exam: 





LUNGS:Clear


HEART: regular rhythm


ABDOMEN: Soft, +BS


EXTREMITIES: left arm kept immobilized. Left leg wound C/D/I. 1+ edema at ankles


NEUROLOGIC: alert. Muscle strength 5/5 on right, left leg 3/5 due to pain


Assessment/Plan: 





1. Left hip fracture, S/P hemiarthroplasty: WBAT. PT/OT


2. Left Clavicle fracture: NWB, LUE. 


3. Paroxysmal A. fib: Sotalol/Cardizem. Xarelto. 


4. DVT Prophylaxis: Xarelto


5. GERD: omeprazole


6. Advanced Directives: Full code


7. Urinary Tract Infection: Levaquin, day #4/6























07/30/18 17:26

## 2018-07-31 RX ADMIN — Medication SCH UNITS: at 09:38

## 2018-07-31 RX ADMIN — DILTIAZEM HYDROCHLORIDE SCH MG: 120 CAPSULE, COATED, EXTENDED RELEASE ORAL at 09:38

## 2018-07-31 RX ADMIN — OMEPRAZOLE SCH MG: 20 CAPSULE, DELAYED RELEASE ORAL at 05:23

## 2018-07-31 RX ADMIN — SOTALOL HYDROCHLORIDE SCH MG: 80 TABLET ORAL at 20:09

## 2018-07-31 RX ADMIN — DOCUSATE SODIUM SCH: 100 CAPSULE, LIQUID FILLED ORAL at 20:10

## 2018-07-31 RX ADMIN — ACETAMINOPHEN PRN MG: 325 TABLET ORAL at 10:41

## 2018-07-31 RX ADMIN — LEVOFLOXACIN SCH MG: 250 TABLET, FILM COATED ORAL at 16:50

## 2018-07-31 RX ADMIN — ATORVASTATIN CALCIUM SCH MG: 20 TABLET, FILM COATED ORAL at 20:09

## 2018-07-31 RX ADMIN — SOTALOL HYDROCHLORIDE SCH MG: 80 TABLET ORAL at 09:39

## 2018-07-31 RX ADMIN — DOCUSATE SODIUM SCH: 100 CAPSULE, LIQUID FILLED ORAL at 09:38

## 2018-07-31 RX ADMIN — RIVAROXABAN SCH MG: 20 TABLET, FILM COATED ORAL at 09:38

## 2018-07-31 NOTE — PN
Progress Note


Date of Service: 07/31/18


Note: 


FACUNDO BOCANEGRA was visited. Therapy notes read and reviewed. Will discuss in 

interdisciplinary team rounds later. She feels like she is moving better. She 

otherwise has no complaints. Urinary frequency improving








Current Medications: 


 Active Medications











Generic Name Dose Route Start Last Admin





  Trade Name Freq  PRN Reason Stop Dose Admin


 


Acetaminophen  650 mg  07/25/18 12:22  07/31/18 10:41





  Tylenol Tab*  PO   650 mg





  Q6H PRN   Administration





  FEVER/PAIN   





     





     





     


 


Atorvastatin Calcium  20 mg  07/25/18 21:00  07/30/18 20:05





  Lipitor*  PO   20 mg





  2100 SUMA   Administration





     





     





     





     


 


Cholecalciferol  4,000 units  07/26/18 09:00  07/31/18 09:38





  Vitamin D Tab*  PO   4,000 units





  DAILY SUMA   Administration





     





     





     





     


 


Diltiazem HCl  120 mg  07/26/18 09:00  07/31/18 09:38





  Cardizem Cd Cap*  PO   120 mg





  DAILY SUMA   Administration





     





     





     





     


 


Docusate Sodium  100 mg  07/25/18 21:00  07/31/18 09:38





  Colace Cap*  PO   Not Given





  BID SUMA   





     





     





     





     


 


Levofloxacin  250 mg  07/27/18 17:00  07/30/18 17:05





  Levaquin Tab*  PO   250 mg





  Q24H SUMA   Administration





     





     





     





     


 


Magnesium Hydroxide  30 ml  07/25/18 12:22  





  Milk Of Magnesia Liq*  PO   





  Q6H PRN   





  CONSTIPATION   





     





     





     


 


Omeprazole  20 mg  07/26/18 06:00  07/31/18 05:23





  Prilosec Cap*  PO   20 mg





  0600 SUMA   Administration





     





     





     





     


 


Oxycodone/Acetaminophen  1 tab  07/25/18 12:32  





  Percocet 5/325 Tab*  PO   





  Q4H PRN   





  PAIN - MODERATE TO SEVERE   





     





     





     


 


Rivaroxaban  20 mg  07/27/18 09:00  07/31/18 09:38





  Xarelto(*)  PO   20 mg





  DAILY SUMA   Administration





     





     





     





     


 


Senna  2 tab  07/25/18 12:22  





  Senokot Tab*  PO   





  BEDTIME PRN   





  CONSTIPATION   





     





     





     


 


Sotalol HCl  80 mg  07/25/18 21:00  07/31/18 09:39





  Betapace Tab*  PO   80 mg





  BID SUMA   Administration





     





     





     





     


 


Tramadol HCl  50 mg  07/25/18 12:33  





  Ultram*  PO   





  Q6H PRN   





  PAIN - MODERATE   





     





     





     











Vital Signs: 


 Vital Signs











Temp Pulse Resp BP Pulse Ox


 


 98.8 F   71   18   103/53   97 


 


 07/31/18 05:26  07/31/18 05:26  07/31/18 05:26  07/31/18 05:26  07/31/18 05:26











Exam: 





LUNGS:Clear


HEART: regular rhythm


ABDOMEN: Soft, +BS


EXTREMITIES: left arm kept immobilized. Left leg wound C/D/I. 1+ edema at ankles


NEUROLOGIC: alert. Muscle strength 5/5 on right, left leg 3/5 due to pain


Assessment/Plan: 





1. Left hip fracture, S/P hemiarthroplasty: WBAT. PT/OT


2. Left Clavicle fracture: NWB, LUE. 


3. Paroxysmal A. fib: Sotalol/Cardizem. Xarelto. 


4. DVT Prophylaxis: Xarelto


5. GERD: omeprazole


6. Advanced Directives: Full code


7. Urinary Tract Infection: Levaquin, day #5/6














07/31/18 11:39

## 2018-07-31 NOTE — PMRUTEAM
PMRU: Team Meeting


Current Status: 


 Nursing: Current Status











Skin Deviations [Lt hip        Blister





superior to incision]          


 


Skin Deviations [Left hip      Incision





distal to incision]            


 


Skin Deviations [Left Shoulder Bruise





]                              


 


Skin Deviations [Bilateral     Other





Buttocks]                      


 


Skin Deviations [Left Lower    Wound





Leg]                           


 


Skin Deviations [Left Hip]     Incision


 


Skin Deviations [Right         Bruise





Shoulder]                      


 


Skin Deviation Description [Lt drsg in place





hip superior to incision]      


 


Skin Deviation Description [   Antibiotic ointment applied with mepilex





Left hip distal to incision]   


 


Skin Deviation Description [   reddened, lotion applied





Bilateral Buttocks]            


 


Skin Deviation Description [   not visualized, dressing in place





Left Lower Leg]                


 


Skin Deviation Description [   drsg in place





Left Hip]                      


 


Skin Deviation Description [   healing





Right Shoulder]                


 


Drain Type [Left Lower Leg]    None


 


Drain Type [Left Hip]          None


 


Bladder Current Status         continent


 


Bowel Current Status           continent


 


Nutrition Current Status       good


 


Medication Current Status      needs reinforcement











 Physical Therapy: Current Status











Bed Mobility Assistance        Contact Guard Assist,Min Assist


 


Transfer Moblility Assistance  Supervision


 


Transfer/Bed Mobility          Rayshawn Walker





Recommended Devices            


 


Transfer Mobility Comment      Pt. is progressing towards an independent 

transfer





 using  a rayshawn-cane.


 


Ambulation Assistance          Supervision


 


Ambulation Assistive Devices   Rayshawn Walker


 


Number of Feet Patient         150'





Ambulated                      


 


Ambulation Comment             Pt. presents a antalgic short step length





 reciprocal type gait pattern.


 


Stairs Assistance              Supervision


 


Stairs Recommended Devices     One Rail


 


Number of Stairs               2x3


 


Curb                           Not Tested


 


Objective Comments             patietn ascends and descends therapy stairs





 without difficulty.  needs cuing for procedure and





 to avoid rotation in descent.  patient states "





 that was easier than I though it was going to be".














 Occupational Therapy: Current Status











Upper Body Dressing            Supervision


 


Upper Body Dressing Progress   set up


 


Lower Body Dressing            Supervision


 


Lower Body Dressing Progress   set up with AE


 


Bathing                        Contact Guard Assist


 


Bathing Progress               CGA for deandra area hygiene, S for all other 

bathing


 


Toileting                      Supervision


 


Toilet Transfer                Contact Guard Assist


 


Shower Transfer                Contact Guard Assist


 


Eating                         Independent














 Rec Therapy: Current Status











Summary of Assessment and      Pt. was open to conversation - cooperative and





Clinical Impression            engaged throughout.  Pt. identified with 

interests





 and involvement in most of them prior to





 admission.  Pt. states she enjoys her life and was





 open to continued visits.


 


Treatment Goals                Pt. will engage in leisure activities while on 

the





 unit.


 


Treatment Plan                 Provide RT services and encourage involvement.














 Social Work: Current Status











Discharge Plan                 return home with home care svs and family support

`


 


Potential for Family Training  pt's daughter is involved and supportive


 


Anticipated Discharge          Home





Destination                    


 


Discharge With                 home care svs and family support














 Nutrition: Current Status











Monitoring                     Adm for rehab post L hip/clavicle fractures.





 Receiving heart healthy diet.  Intake is good (85-





 100%) and meeting needs.  Wound L hip but





 otherwise no skin issues, no issues w/constipation





 .  Meeting needs.  No specific nutrition





 intervention indicated.














Goals: 


 Physical Therapy: Initial Goals











Bed Mobility Assistance        Independent


 


Transfer Mobility Assistance   Independent


 


Transfer/Bed Mobility          Large Base Quad Cane,Rayshawn Walker





Recommended Devices            


 


Ambulation                     Independent


 


Ambulation Recommended Devices Large Base Quad Cane,Rayshawn Walker


 


Ambulation Distance            150


 


Stairs Assistance              Independent


 


Stair Recommended Devices      One Rail


 


Number of Stairs               5














 Physical Therapy: Updated Goals











Bed Mobility Assistance        Independent


 


Transfer Mobility Assistance   Independent


 


Transfer/Bed Mobility          Rayshawn Walker





Recommended Devices            


 


Ambulation Assistance          Independent


 


Ambulation Assistive Devices   Large Base Quad Cane,Rayshawn Walker


 


Ambulation Distance (ft)       150'


 


Stairs Assistance              Independent


 


Stairs Recommended Devices     One Rail


 


Number of Stairs               5














 Occupational Therapy: Initial Goals











Goals to be Completed in (Days 14





)                              


 


Upper Body Bathing Routine     Independent


 


Lower Body Bathing Routine     Modified Independent with


 


Upper Body Dressing Routine    Independent


 


Lower Body Dressing Routine    Modified Independent with


 


Toilet Hygeine and Clothing    Modified Independent with





Management Routine             


 


Toilet Transfer Routine        Modified Independent with


 


Step-In Shower Transfer        Modified Independent with





Routine                        


 


Tub Transfer Routine           Modified Independent with


 


Functional Transfers for ADL   Modified Independent with


 


Grooming Routine               Independent


 


Feeding Routine                Independent


 


Light Housekeeping Tasks       Modified Independent with














 Nursing: Goals











Bladder Goal                   indep


 


Bowel Goal                     indep


 


Nutrition Goal                 100% of meals


 


Medication Goal                suppervision to indep














 Nutrition: Goals











Intervention Goals             1.  Maintain adequate oral intake to support





 maintenance of lean body mass w/o contributing to





 undesirable weight gain.











 Social Work: Goals











Discharge Plan                 return home with home care svs and family support

`


 


Potential for Family Training  pt's daughter is involved and supportive


 


Anticipated Discharge          Home





Destination                    


 


Discharge With                 home care svs and family support

















Care Plan: 


 Care Plan





ADL's - Improve/Maintain                Start:  07/26/18 15:10              


Freq:   DAILY                           Status: Active      Target:         


Protocol:                                                                   








Activity Type Activity Date Activity User E-Sign Co-Sign Detail





Recorded Client Recorded Date Recorded By   


 


Document 07/30/18 14:45 [id number]   





PMRU-C04 07/30/18 14:46 [id number]   














  07/30/18





  14:45


 


PMRU Outcome: ADL's/ADL Transfers 


 


Orders/Interventions Occupational





 Therapy





 Evaluation &





 Treatment





 Communication





 Tool in Patient





 Room


 


Patient to receive OT 5x/wk for  Therex





min/day Self Care





 Management





 Group Therapy


 


UE/LE ADL's with Assist Yes: mod I


 


ADL Transfers with Assist Yes: mod I


 


Toileting: Transfers,Clothing Management Yes: mod I





,Hygeine w/Assist 


 


Progression Toward Outcome/Goals Progressing


 


Outcome/Goals Met Pt.





 demonstrates





 increased





 independence in





 all ADLs and





 with the use of





 AE.





 Pt requires CGA





 during





 transfers for





 safety and to





 decrease Pt





 concern over





 falling.





 Pt demonstrates





 good balance





 during all ADLs





 and transfers.





 Pt required no





 cues to





 maintain





 anterior THP





 for LLE or NWB





 orders for LUE





 functionally.








Cardiovascular- Improve/Maintain        Start:  07/25/18 16:01              


Freq:   QSHIFT                          Status: Complete    Target:         


Protocol:                                                                   








Activity Type Activity Date Activity User E-Sign Co-Sign Detail





Recorded Client Recorded Date Recorded By   


 


Document 07/28/18 08:00 [id number]   





PMRU-C07 07/28/18 09:07 [id number]   














  07/28/18





  08:00


 


PMRU Outcome: Cardiovascular 


 


Vital Signs q Shift for 48hrs Then BID Yes


 


Daily Weight Ordered No


 


Current Cardiovascular Outcome/Goal Maintain/





 Achieve





 Baseline HR, BP





 , Perfusion


 


Outcomes/Goals Met Maintain/





 Achieve





 Baseline HR, BP





 , Perfusion





 Free of





 Abnormal





 Cardiac





 Symptoms








DVT Prophylaxis- Improve/Maintain       Start:  07/25/18 16:01              


Freq:   QSHIFT                          Status: Active      Target:         


Protocol:                                                                   








Activity Type Activity Date Activity User E-Sign Co-Sign Detail





Recorded Client Recorded Date Recorded By   


 


Document 07/31/18 02:43 [id number]   





PMRU-C03 07/31/18 02:44 [id number]   














  07/31/18





  02:43


 


PMRU Outcome: DVT Prophylaxis 


 


Outcome/Goals Remains Free of





 DVT





 Complies with





 DVT Prophylaxis





 /Treatment





 Other


 


Progression Toward Outcome/Goals Progressing








Discharge Planning - Improve/Maintain   Start:  07/25/18 16:01              


Freq:   DAILY                           Status: Active      Target:         


Protocol:                                                                   








Activity Type Activity Date Activity User E-Sign Co-Sign Detail





Recorded Client Recorded Date Recorded By   


 


Document 07/31/18 02:43 [id number]   





PMRU-C03 07/31/18 02:44 [id number]   














  07/31/18





  02:43


 


PMRU Outcome: Discharge Planning 


 


Update Patient Family No


 


Outcome/Goals Demonstrates





 Understanding





 of Discharge





 Plan


 


Progression Toward Outcome/Goals Progressing








Education-Improve/Maintain              Start:  07/25/18 16:01              


Freq:   QSHIFT                          Status: Active      Target:         


Protocol:                                                                   








Activity Type Activity Date Activity User E-Sign Co-Sign Detail





Recorded Client Recorded Date Recorded By   


 


Document 07/31/18 02:43 [id number]   





PMRU-C03 07/31/18 02:44 [id number]   














  07/31/18





  02:43


 


PMRU Outcome: Education 


 


Outcome/Goals Demonstrate/





 Verbalize





 Understanding





 of Written





 Discharge





 Instructions





 Demonstrates





 Skills





 Encourage





 Questions


 


Progression Toward Outcome/Goals Progressing








/GI-Improve/Maintain                  Start:  07/25/18 16:01              


Freq:   QSHIFT                          Status: Active      Target:         


Protocol:                                                                   








Activity Type Activity Date Activity User E-Sign Co-Sign Detail





Recorded Client Recorded Date Recorded By   


 


Document 07/31/18 02:43 [id number]   





PMRU-C03 07/31/18 02:44 [id number]   














  07/31/18





  02:43


 


PMRU Outcome: Genitourinary/ 





Gastrointestinal 


 


Genitourinary- Outcome/Goals Maintain/





 Achieve Urinary





 Continence


 


Gastrointestinal-Outcome/Goals Maintain/





 Achieve Bowel





 Regularity in





 Accordance with





 Pt's Baseline


 


Progression Toward Outcome/Goals -  Progressing


 


Progression Toward Outcome/Goals - GI Progressing


 


Outcome/Goals Met Comment pt up to BR








Medication Administration               Start:  07/25/18 16:01              


Freq:   QSHIFT                          Status: Active      Target:         


Protocol:                                                                   








Activity Type Activity Date Activity User E-Sign Co-Sign Detail





Recorded Client Recorded Date Recorded By   


 


Document 07/31/18 02:43 [id number]   





PMRU-C03 07/31/18 02:44 [id number]   














  07/31/18





  02:43


 


PMRU Outcome: Medication Administration 


 


Assess Patient Knowledge/Teach Med Yes





Education for all Meds 


 


Outcome/Goals Demonstrates





 Understanding


 


Progression Towards Outcome/Goals Progressing


 


Is Patient Going Home on Lovenox? No








Mobility- Improve/Maintain              Start:  07/27/18 16:55              


Freq:   DAILY                           Status: Active      Target:         


Protocol:                                                                   








Activity Type Activity Date Activity User E-Sign Co-Sign Detail





Recorded Client Recorded Date Recorded By   


 


Document 07/28/18 16:28 [id number]   





PMRU-C08 07/28/18 16:28 [id number] 07/28/18





  16:28


 


PMRU Outcome: Mobility 


 


Physical Therapy Evaluation and Yes





Treatment 


 


Activity OOB with Assistance Yes


 


WBAT Yes: LLE


 


NWB Yes: LUE


 


Device Yes


 


Assistance Yes


 


Patient to be seen 5x/wk for  min/ Therex





day for: Mobility





 Training





 Gait Training





 Balance


 


Outcome/Goals Maintain/





 Achieve





 Baseline





 Mobility Status





 Improve





 Mobility Status





 Demonstrates





 Proper Use of





 Assistive





 Devices





 Free from





 Complications





 of Immobility


 


Progression Toward Outcome/Goals Progressing


 


Bed Mobility Yes:





 independent


 


Transfers Yes:





 independent





 with hemiwalker





 or quad cane


 


Gait x ft Yes: indeendent





 with





 hemiwalker or





 quad cane to





 150'


 


Up/Down Stairs Yes:





 independent up/





 down 5 stairs





 with 1 rail








Pain/Comfort- Improve/Maintain          Start:  07/25/18 16:01              


Freq:   QSHIFT                          Status: Complete    Target:         


Protocol:                                                                   








Activity Type Activity Date Activity User E-Sign Co-Sign Detail





Recorded Client Recorded Date Recorded By   


 


Document 07/28/18 08:00 [id number]   





PMRU-C07 07/28/18 09:07 [id number]   














  07/28/18





  08:00


 


PMRU Outcome: Pain/Comfort 


 


Outcome/Goals Demonstrates





 Knowledge and





 Use of





 Available





 Comfort





 Measures





 Achieves





 Acceptable





 Comfort/Pain





 Level as





 Determined by





 Patient/Condit





 Maintain





 Comfort Level





 Allowing





 Patient to





 Fully





 Participate in





 Rehab


 


Outcome/Goals Met Maintain





 Comfort Level





 Allowing





 Patient to





 Fully





 Participate in





 Rehab








Skin- Improve/Maintain                  Start:  07/25/18 16:01              


Freq:   QSHIFT                          Status: Active      Target:         


Protocol:                                                                   








Activity Type Activity Date Activity User E-Sign Co-Sign Detail





Recorded Client Recorded Date Recorded By   


 


Document 07/31/18 02:43 [id number]   





PMRU-C03 07/31/18 02:44 [id number]   














  07/31/18





  02:43


 


PMRU Outcome: Skin 


 


Skin Risk Level Medium


 


Outcome/Goals Maintain/





 Improve Skin





 Intergrity





 Surgical





 Incisions





 Healing


 


Progression Toward Outcome/Goals Progressing














Medicine Note: 








Length of Stay:  3 days





Anticipated Discharge Destination: Home





Tentative Discharge Date:  August 3, 2018





Discharged to:  Home

## 2018-08-01 RX ADMIN — RIVAROXABAN SCH MG: 20 TABLET, FILM COATED ORAL at 09:14

## 2018-08-01 RX ADMIN — OMEPRAZOLE SCH MG: 20 CAPSULE, DELAYED RELEASE ORAL at 05:03

## 2018-08-01 RX ADMIN — DILTIAZEM HYDROCHLORIDE SCH MG: 120 CAPSULE, COATED, EXTENDED RELEASE ORAL at 09:14

## 2018-08-01 RX ADMIN — ATORVASTATIN CALCIUM SCH MG: 20 TABLET, FILM COATED ORAL at 20:23

## 2018-08-01 RX ADMIN — SOTALOL HYDROCHLORIDE SCH MG: 80 TABLET ORAL at 09:14

## 2018-08-01 RX ADMIN — ACETAMINOPHEN PRN MG: 325 TABLET ORAL at 09:14

## 2018-08-01 RX ADMIN — DOCUSATE SODIUM SCH: 100 CAPSULE, LIQUID FILLED ORAL at 20:24

## 2018-08-01 RX ADMIN — LEVOFLOXACIN SCH MG: 250 TABLET, FILM COATED ORAL at 17:33

## 2018-08-01 RX ADMIN — Medication SCH UNITS: at 09:14

## 2018-08-01 RX ADMIN — SOTALOL HYDROCHLORIDE SCH MG: 80 TABLET ORAL at 20:23

## 2018-08-01 RX ADMIN — DOCUSATE SODIUM SCH: 100 CAPSULE, LIQUID FILLED ORAL at 09:19

## 2018-08-01 NOTE — PN
Progress Note


Date of Service: 08/01/18


Note: 


FACUNDO BOCANEGRA was visited. Therapy notes read and reviewed. She is moving 

pretty well and feels like she will be ready by Friday. Finished Levaquin for 

UTI








Current Medications: 


 Active Medications











Generic Name Dose Route Start Last Admin





  Trade Name Freq  PRN Reason Stop Dose Admin


 


Acetaminophen  650 mg  07/25/18 12:22  08/01/18 09:14





  Tylenol Tab*  PO   650 mg





  Q6H PRN   Administration





  FEVER/PAIN   





     





     





     


 


Atorvastatin Calcium  20 mg  07/25/18 21:00  07/31/18 20:09





  Lipitor*  PO   20 mg





  2100 SUMA   Administration





     





     





     





     


 


Cholecalciferol  4,000 units  07/26/18 09:00  08/01/18 09:14





  Vitamin D Tab*  PO   4,000 units





  DAILY SUMA   Administration





     





     





     





     


 


Diltiazem HCl  120 mg  07/26/18 09:00  08/01/18 09:14





  Cardizem Cd Cap*  PO   120 mg





  DAILY SUMA   Administration





     





     





     





     


 


Docusate Sodium  100 mg  07/25/18 21:00  08/01/18 09:19





  Colace Cap*  PO   Not Given





  BID SUMA   





     





     





     





     


 


Levofloxacin  250 mg  07/27/18 17:00  08/01/18 17:33





  Levaquin Tab*  PO  08/01/18 23:59  250 mg





  Q24H SUMA   Administration





     





     





     





     


 


Magnesium Hydroxide  30 ml  07/25/18 12:22  





  Milk Of Magnesia Liq*  PO   





  Q6H PRN   





  CONSTIPATION   





     





     





     


 


Omeprazole  20 mg  07/26/18 06:00  08/01/18 05:03





  Prilosec Cap*  PO   20 mg





  0600 SUMA   Administration





     





     





     





     


 


Oxycodone/Acetaminophen  1 tab  07/25/18 12:32  





  Percocet 5/325 Tab*  PO   





  Q4H PRN   





  PAIN - MODERATE TO SEVERE   





     





     





     


 


Rivaroxaban  20 mg  07/27/18 09:00  08/01/18 09:14





  Xarelto(*)  PO   20 mg





  DAILY SUMA   Administration





     





     





     





     


 


Senna  2 tab  07/25/18 12:22  





  Senokot Tab*  PO   





  BEDTIME PRN   





  CONSTIPATION   





     





     





     


 


Sotalol HCl  80 mg  07/25/18 21:00  08/01/18 09:14





  Betapace Tab*  PO   80 mg





  BID SUMA   Administration





     





     





     





     


 


Tramadol HCl  50 mg  07/25/18 12:33  





  Ultram*  PO   





  Q6H PRN   





  PAIN - MODERATE   





     





     





     











Vital Signs: 


 Vital Signs











Temp Pulse Resp BP Pulse Ox


 


 97.7 F   70   16   96/45   100 


 


 08/01/18 16:27  08/01/18 16:27  08/01/18 16:27  08/01/18 16:27  08/01/18 17:58











Exam: 





LUNGS:Clear


HEART: regular rhythm


ABDOMEN: Soft, +BS


EXTREMITIES: left arm kept immobilized. Left leg wound C/D/I. 1+ edema at ankles


NEUROLOGIC: alert. Muscle strength 5/5 on right, left leg 3/5 due to pain


Assessment/Plan: 





1. Left hip fracture, S/P hemiarthroplasty: WBAT. PT/OT


2. Left Clavicle fracture: NWB, LUE. 


3. Paroxysmal A. fib: Sotalol/Cardizem. Xarelto. 


4. DVT Prophylaxis: Xarelto


5. GERD: omeprazole


6. Advanced Directives: Full code


7. Urinary Tract Infection: Levaquin, day #6/6 08/01/18 19:20

## 2018-08-02 RX ADMIN — SOTALOL HYDROCHLORIDE SCH MG: 80 TABLET ORAL at 07:27

## 2018-08-02 RX ADMIN — ATORVASTATIN CALCIUM SCH MG: 20 TABLET, FILM COATED ORAL at 20:17

## 2018-08-02 RX ADMIN — OMEPRAZOLE SCH MG: 20 CAPSULE, DELAYED RELEASE ORAL at 05:41

## 2018-08-02 RX ADMIN — DOCUSATE SODIUM SCH: 100 CAPSULE, LIQUID FILLED ORAL at 07:30

## 2018-08-02 RX ADMIN — Medication SCH UNITS: at 07:28

## 2018-08-02 RX ADMIN — SOTALOL HYDROCHLORIDE SCH MG: 80 TABLET ORAL at 20:17

## 2018-08-02 RX ADMIN — DILTIAZEM HYDROCHLORIDE SCH MG: 120 CAPSULE, COATED, EXTENDED RELEASE ORAL at 07:28

## 2018-08-02 RX ADMIN — ACETAMINOPHEN PRN MG: 325 TABLET ORAL at 07:28

## 2018-08-02 RX ADMIN — DOCUSATE SODIUM SCH: 100 CAPSULE, LIQUID FILLED ORAL at 20:21

## 2018-08-02 RX ADMIN — RIVAROXABAN SCH MG: 20 TABLET, FILM COATED ORAL at 07:28

## 2018-08-02 NOTE — PN
Progress Note


Date of Service: 08/02/18


Note: 


FACUNDO BOCANEGRA was visited. Therapy notes read and reviewed. For discharge in 

am. She is moving well. 


 





Current Medications: 


 Active Medications











Generic Name Dose Route Start Last Admin





  Trade Name Freq  PRN Reason Stop Dose Admin


 


Acetaminophen  650 mg  07/25/18 12:22  08/02/18 07:28





  Tylenol Tab*  PO   650 mg





  Q6H PRN   Administration





  FEVER/PAIN   





     





     





     


 


Atorvastatin Calcium  20 mg  07/25/18 21:00  08/01/18 20:23





  Lipitor*  PO   20 mg





  2100 SUMA   Administration





     





     





     





     


 


Cholecalciferol  4,000 units  07/26/18 09:00  08/02/18 07:28





  Vitamin D Tab*  PO   4,000 units





  DAILY SUMA   Administration





     





     





     





     


 


Diltiazem HCl  120 mg  07/26/18 09:00  08/02/18 07:28





  Cardizem Cd Cap*  PO   120 mg





  DAILY SUMA   Administration





     





     





     





     


 


Docusate Sodium  100 mg  07/25/18 21:00  08/02/18 07:30





  Colace Cap*  PO   Not Given





  BID SUMA   





     





     





     





     


 


Magnesium Hydroxide  30 ml  07/25/18 12:22  





  Milk Of Magnesia Liq*  PO   





  Q6H PRN   





  CONSTIPATION   





     





     





     


 


Omeprazole  20 mg  07/26/18 06:00  08/02/18 05:41





  Prilosec Cap*  PO   20 mg





  0600 SUMA   Administration





     





     





     





     


 


Oxycodone/Acetaminophen  1 tab  07/25/18 12:32  





  Percocet 5/325 Tab*  PO   





  Q4H PRN   





  PAIN - MODERATE TO SEVERE   





     





     





     


 


Rivaroxaban  20 mg  07/27/18 09:00  08/02/18 07:28





  Xarelto(*)  PO   20 mg





  DAILY SUMA   Administration





     





     





     





     


 


Senna  2 tab  07/25/18 12:22  





  Senokot Tab*  PO   





  BEDTIME PRN   





  CONSTIPATION   





     





     





     


 


Sotalol HCl  80 mg  07/25/18 21:00  08/02/18 07:27





  Betapace Tab*  PO   80 mg





  BID SUMA   Administration





     





     





     





     


 


Tramadol HCl  50 mg  07/25/18 12:33  





  Ultram*  PO   





  Q6H PRN   





  PAIN - MODERATE   





     





     





     











Vital Signs: 


 Vital Signs











Temp Pulse Resp BP Pulse Ox


 


 98.1 F   75   20   102/46   100 


 


 08/02/18 15:53  08/02/18 15:55  08/02/18 15:53  08/02/18 15:55  08/02/18 15:53











Exam: 





LUNGS:Clear


HEART: regular rhythm


ABDOMEN: Soft, +BS


EXTREMITIES: left arm kept immobilized. Left leg wound C/D/I. 1+ edema at ankles


NEUROLOGIC: alert. Muscle strength 5/5 on right, left leg 3/5 due to pain


Assessment/Plan: 





1. Left hip fracture, S/P hemiarthroplasty: WBAT. PT/OT


2. Left Clavicle fracture: NWB, LUE. 


3. Paroxysmal A. fib: Sotalol/Cardizem. Xarelto. 


4. DVT Prophylaxis: Xarelto


5. GERD: omeprazole


6. Advanced Directives: Full code























08/02/18 16:24

## 2018-08-03 VITALS — SYSTOLIC BLOOD PRESSURE: 106 MMHG | DIASTOLIC BLOOD PRESSURE: 51 MMHG

## 2018-08-03 LAB
BASOPHILS # BLD AUTO: 0.1 10^3/UL (ref 0–0.2)
EOSINOPHIL # BLD AUTO: 0.1 10^3/UL (ref 0–0.6)
HCT VFR BLD AUTO: 34 % (ref 35–47)
HGB BLD-MCNC: 11.4 G/DL (ref 12–16)
LYMPHOCYTES # BLD AUTO: 1.7 10^3/UL (ref 1–4.8)
MCH RBC QN AUTO: 28 PG (ref 27–31)
MCHC RBC AUTO-ENTMCNC: 33 G/DL (ref 31–36)
MCV RBC AUTO: 84 FL (ref 80–97)
MONOCYTES # BLD AUTO: 0.8 10^3/UL (ref 0–0.8)
NEUTROPHILS # BLD AUTO: 5.1 10^3/UL (ref 1.5–7.7)
NRBC # BLD AUTO: 0 10^3/UL
NRBC BLD QL AUTO: 0.1
PLATELET # BLD AUTO: 374 10^3/UL (ref 150–450)
RBC # BLD AUTO: 4.08 10^6/UL (ref 4–5.4)
WBC # BLD AUTO: 7.8 10^3/UL (ref 3.5–10.8)

## 2018-08-03 RX ADMIN — DILTIAZEM HYDROCHLORIDE SCH MG: 120 CAPSULE, COATED, EXTENDED RELEASE ORAL at 08:47

## 2018-08-03 RX ADMIN — DOCUSATE SODIUM SCH MG: 100 CAPSULE, LIQUID FILLED ORAL at 08:47

## 2018-08-03 RX ADMIN — Medication SCH UNITS: at 08:47

## 2018-08-03 RX ADMIN — OMEPRAZOLE SCH MG: 20 CAPSULE, DELAYED RELEASE ORAL at 06:34

## 2018-08-03 RX ADMIN — DOCUSATE SODIUM SCH: 100 CAPSULE, LIQUID FILLED ORAL at 09:00

## 2018-08-03 RX ADMIN — SOTALOL HYDROCHLORIDE SCH MG: 80 TABLET ORAL at 08:47

## 2018-08-03 RX ADMIN — RIVAROXABAN SCH MG: 20 TABLET, FILM COATED ORAL at 08:47

## 2018-08-03 RX ADMIN — ACETAMINOPHEN PRN MG: 325 TABLET ORAL at 08:49

## 2018-08-03 NOTE — DS
REHABILITATION DISCHARGE SUMMARY:

 

DATE OF ADMISSION:  07/25/18

 

DATE OF DISCHARGE:  08/03/18

 

REASON FOR ADMISSION:  Left hip and left clavicle fractures.

 

DICTATION ENDS ABRUPTLY

 

 [id number]/CPS #: [id number]

MTDD

## 2018-08-03 NOTE — DS
REHABILITATION DISCHARGE SUMMARY:

 

DATE OF ADMISSION:  07/25/18

 

DATE OF DISCHARGE:  08/03/18

 

REASON FOR ADMISSION:  Left hip and left clavicle fractures.

 

HISTORY OF PRESENT ILLNESS:  For full details of this acute hospitalization, 
please see the note dictated by Dr. Gaines on 07/25/18.

 

Briefly, she lost her balance and fell on 07/14/18 sustaining left hip and 
clavicle fractures.  She was seen by Dr. Stiles and underwent 
hemiarthroplasty of the left hip.  For her left shoulder, she was made non-
weightbearing and put in a sling. Postoperatively, she went into rapid atrial 
fibrillation and ultimately was put on sotalol after Cardiology consultation.  
She was also started on Xarelto.

 

HOSPITAL COURSE:  During her time on [facility], [place], her hemoglobin and hematocrit 
remained stable on the initiation of Xarelto.  This was also used for DVT 
prophylaxis.  On the day of discharge, her staples were removed and Steri-
Strips were placed without any concerns.  She has remained in normal sinus 
rhythm during her rehabilitation stay.  She was diagnosed with a urinary tract 
infection with E. coli on 07/26/18. She completed a course of levofloxacin.  
During her time on the [facility], [place], she participated well with physical therapy.  At 
the time of discharge, she was independent with bed mobility, transfers using a 
hemiwalker or quad cane.  She can ambulate 150 feet using the hemiwalker or 
quad cane.  She is able to do 5 steps using 1 rail.  She is independent with a 
home exercise program.  She has anterior hip precautions.  She also 
participated well with occupational therapy and at the time of discharge was 
independent with eating.  She required supervision initially for bathing and 
tub transfers.  She was independent with dressing using the hemiwalker. She was 
independent with toileting using the hemiwalker.  She was independent doing 
home management and cooking with the hemiwalker.  She is once again advised not 
to weight bear through the left arm until cleared by Orthopedics and continues 
with anterior hip precautions until cleared by Dr. Stiles.

 

DISCHARGE CONDITION:  Good.

 

DISCHARGE DISPOSITION:  Home.

 

DISCHARGE MEDICATIONS:

1.  Vitamin D 4000 units daily.

2.  Diltiazem  mg daily.

3.  Omeprazole 20 mg daily.

4.  Xarelto 20 mg daily.

5.  Sotalol 80 mg b.i.d.

6.  Tramadol 50 mg q.6 hours p.r.n., not to exceed 3 pills per day.

7.  Rosuvastatin 10 mg q.h.s.

 

FOLLOWUP:

1.  A referral has been sent to the visiting nurse services for home care and 
therapies.

2.  Follow up with Dr. Stiles next week in Orthopedics.

3.  Follow up with Dr. Cruz within the next month.

4.  Follow up with Dr. Charlton, her primary care provider, in 1 to 2 weeks.

 

DISCHARGE DIAGNOSES:

1.  Left hip fracture status post hemiarthroplasty.

2.  Left clavicle fracture.

3.  Paroxysmal atrial fibrillation.

4.  Gastroesophageal reflux disease.

5.  Osteoporosis.

6.  Hyperlipidemia.

 

 [id number]/CPS #: [id number]

MTDD

## 2018-08-03 NOTE — PN
Progress Note


Date of Service: 08/03/18


Note: 


FACUNDO BOCANEGRA was visited. Nursing and therapy notes read and reviewed. No 

chest pain, shortness of breath or abdominal pain. She is eager to d/c home 

today.








Current Medications: 


 Active Medications











Generic Name Dose Route Start Last Admin





  Trade Name Freq  PRN Reason Stop Dose Admin


 


Acetaminophen  650 mg  07/25/18 12:22  08/03/18 08:49





  Tylenol Tab*  PO   650 mg





  Q6H PRN   Administration





  FEVER/PAIN   





     





     





     


 


Atorvastatin Calcium  20 mg  07/25/18 21:00  08/02/18 20:17





  Lipitor*  PO   20 mg





  2100 SUMA   Administration





     





     





     





     


 


Cholecalciferol  4,000 units  07/26/18 09:00  08/03/18 08:47





  Vitamin D Tab*  PO   4,000 units





  DAILY SUMA   Administration





     





     





     





     


 


Diltiazem HCl  120 mg  07/26/18 09:00  08/03/18 08:47





  Cardizem Cd Cap*  PO   120 mg





  DAILY SUMA   Administration





     





     





     





     


 


Docusate Sodium  100 mg  07/25/18 21:00  08/03/18 09:00





  Colace Cap*  PO   Not Given





  BID SUMA   





     





     





     





     


 


Magnesium Hydroxide  30 ml  07/25/18 12:22  





  Milk Of Magnesia Liq*  PO   





  Q6H PRN   





  CONSTIPATION   





     





     





     


 


Omeprazole  20 mg  07/26/18 06:00  08/03/18 06:34





  Prilosec Cap*  PO   20 mg





  0600 SUMA   Administration





     





     





     





     


 


Oxycodone/Acetaminophen  1 tab  07/25/18 12:32  





  Percocet 5/325 Tab*  PO   





  Q4H PRN   





  PAIN - MODERATE TO SEVERE   





     





     





     


 


Rivaroxaban  20 mg  07/27/18 09:00  08/03/18 08:47





  Xarelto(*)  PO   20 mg





  DAILY SUMA   Administration





     





     





     





     


 


Senna  2 tab  07/25/18 12:22  





  Senokot Tab*  PO   





  BEDTIME PRN   





  CONSTIPATION   





     





     





     


 


Sotalol HCl  80 mg  07/25/18 21:00  08/03/18 08:47





  Betapace Tab*  PO   80 mg





  BID SUMA   Administration





     





     





     





     


 


Tramadol HCl  50 mg  07/25/18 12:33  





  Ultram*  PO   





  Q6H PRN   





  PAIN - MODERATE   





     





     





     











Vital Signs: 


 Vital Signs











Temp Pulse Resp BP Pulse Ox


 


 98.3 F   72   16   106/51   97 


 


 08/03/18 06:38  08/03/18 06:38  08/03/18 08:00  08/03/18 06:38  08/03/18 08:00











Lab Results: 


 Laboratory Results - last 24 hr











  08/03/18 08/03/18





  06:24 06:24


 


WBC  7.8 


 


RBC  4.08 


 


Hgb  11.4 L 


 


Hct  34 L 


 


MCV  84 


 


MCH  28 


 


MCHC  33 


 


RDW  14 


 


Plt Count  374 


 


MPV  7.1 L 


 


Neut % (Auto)  65.5 


 


Lymph % (Auto)  22.5 L 


 


Mono % (Auto)  10.3 H 


 


Eos % (Auto)  1.0 


 


Baso % (Auto)  0.7 


 


Absolute Neuts (auto)  5.1 


 


Absolute Lymphs (auto)  1.7 


 


Absolute Monos (auto)  0.8 


 


Absolute Eos (auto)  0.1 


 


Absolute Basos (auto)  0.1 


 


Absolute Nucleated RBC  0 


 


Nucleated RBC %  0.1 


 


Sodium   140


 


Potassium   4.2


 


Chloride   105


 


Carbon Dioxide   30


 


Anion Gap   5


 


BUN   11


 


Creatinine   0.64


 


Est GFR ( Amer)   109.8


 


Est GFR (Non-Af Amer)   90.7


 


BUN/Creatinine Ratio   17.2


 


Glucose   98


 


Calcium   8.6


 


Total Bilirubin   0.40


 


AST   14


 


ALT   15


 


Alkaline Phosphatase   70


 


Total Protein   5.0 L


 


Albumin   2.9 L


 


Globulin   2.1


 


Albumin/Globulin Ratio   1.4











Exam: 





GEN: no acute distress. alert and appropriate


LUNGS:Clear to auscultation bilaterally


HEART: regular rate and rhythm


ABDOMEN: Soft, +BS, soft, non-tender


EXTREMITIES: Left leg wound C/D/I. 3 small blisters of which 2 have burst.  1+ 

edema at ankles


NEUROLOGIC: alert. Muscle strength 5/5 on right, left leg 3/5 due to pain





PROCEDURE: Staples removed from left hip incision and steri strips placed 

without complication. Gauze and tegaderm put over 2 open blisters.


Assessment/Plan: 





1. Left hip fracture, S/P hemiarthroplasty: WBAT. f/u with Dr. Stiles


2. Left Clavicle fracture: NWB, LUE. 


3. Paroxysmal A. fib: Sotalol/Cardizem. Xarelto. f/u with Dr. Cruz


4. DVT Prophylaxis: Xarelto


5. GERD: omeprazole


6. Advanced Directives: Full code


7. Dispo: d/c home today.








08/03/18 10:47